# Patient Record
Sex: MALE | Race: BLACK OR AFRICAN AMERICAN | NOT HISPANIC OR LATINO | Employment: FULL TIME | URBAN - METROPOLITAN AREA
[De-identification: names, ages, dates, MRNs, and addresses within clinical notes are randomized per-mention and may not be internally consistent; named-entity substitution may affect disease eponyms.]

---

## 2017-01-30 ENCOUNTER — OCC HEALTH (OUTPATIENT)
Dept: OCCUPATIONAL MEDICINE | Age: 32
End: 2017-01-30

## 2017-01-30 DIAGNOSIS — Z02.89 ENCOUNTER FOR OCCUPATIONAL HEALTH EXAMINATION: ICD-10-CM

## 2017-01-30 PROCEDURE — OH063 AUDIOMETRIC TESTING INTERP: Performed by: PREVENTIVE MEDICINE

## 2017-01-30 PROCEDURE — 92552 PURE TONE AUDIOMETRY AIR: CPT | Performed by: PREVENTIVE MEDICINE

## 2017-02-01 ENCOUNTER — OCC HEALTH (OUTPATIENT)
Dept: OCCUPATIONAL MEDICINE | Age: 32
End: 2017-02-01

## 2017-02-01 DIAGNOSIS — Z02.89 ENCOUNTER FOR OCCUPATIONAL HEALTH EXAMINATION: Primary | ICD-10-CM

## 2017-02-01 PROCEDURE — OH071 RESPIRATORY (PFT) QUESTIONNAIRE: Performed by: PREVENTIVE MEDICINE

## 2017-08-28 ENCOUNTER — CASE MANAGEMENT (OUTPATIENT)
Dept: CARE COORDINATION | Age: 32
End: 2017-08-28

## 2018-01-09 ENCOUNTER — OCC HEALTH (OUTPATIENT)
Dept: OCCUPATIONAL MEDICINE | Age: 33
End: 2018-01-09

## 2018-01-09 DIAGNOSIS — Z02.89 VISIT FOR OCCUPATIONAL HEALTH EXAMINATION: Primary | ICD-10-CM

## 2018-01-09 PROCEDURE — OH071 RESPIRATORY (PFT) QUESTIONNAIRE: Performed by: PREVENTIVE MEDICINE

## 2018-01-15 ENCOUNTER — OCC HEALTH (OUTPATIENT)
Dept: OCCUPATIONAL MEDICINE | Age: 33
End: 2018-01-15

## 2018-01-15 DIAGNOSIS — Z02.89 VISIT FOR OCCUPATIONAL HEALTH EXAMINATION: ICD-10-CM

## 2018-01-15 DIAGNOSIS — Z02.89 ENCOUNTER FOR OCCUPATIONAL HEALTH EXAMINATION: Primary | ICD-10-CM

## 2018-01-15 PROCEDURE — 92552 PURE TONE AUDIOMETRY AIR: CPT | Performed by: PREVENTIVE MEDICINE

## 2018-01-15 PROCEDURE — OH063 AUDIOMETRIC TESTING INTERP: Performed by: PREVENTIVE MEDICINE

## 2018-04-04 ENCOUNTER — CASE MANAGEMENT (OUTPATIENT)
Dept: CARE COORDINATION | Age: 33
End: 2018-04-04

## 2018-04-19 ENCOUNTER — CASE MANAGEMENT (OUTPATIENT)
Dept: CARE COORDINATION | Age: 33
End: 2018-04-19

## 2018-04-27 ENCOUNTER — CASE MANAGEMENT (OUTPATIENT)
Dept: CARE COORDINATION | Age: 33
End: 2018-04-27

## 2018-05-11 ENCOUNTER — CASE MANAGEMENT (OUTPATIENT)
Dept: CARE COORDINATION | Age: 33
End: 2018-05-11

## 2018-05-14 ENCOUNTER — CASE MANAGEMENT (OUTPATIENT)
Dept: CARE COORDINATION | Age: 33
End: 2018-05-14

## 2018-05-23 ENCOUNTER — CASE MANAGEMENT (OUTPATIENT)
Dept: CARE COORDINATION | Age: 33
End: 2018-05-23

## 2018-06-07 ENCOUNTER — CASE MANAGEMENT (OUTPATIENT)
Dept: CARE COORDINATION | Age: 33
End: 2018-06-07

## 2018-08-09 ENCOUNTER — CASE MANAGEMENT (OUTPATIENT)
Dept: CARE COORDINATION | Age: 33
End: 2018-08-09

## 2018-08-09 VITALS — WEIGHT: 172.6 LBS | BODY MASS INDEX: 25.49 KG/M2

## 2019-01-11 ENCOUNTER — OCC HEALTH (OUTPATIENT)
Dept: OCCUPATIONAL MEDICINE | Age: 34
End: 2019-01-11

## 2019-01-11 DIAGNOSIS — Z02.89 VISIT FOR OCCUPATIONAL HEALTH EXAMINATION: ICD-10-CM

## 2019-01-11 DIAGNOSIS — Z02.89 VISIT FOR OCCUPATIONAL HEALTH EXAMINATION: Primary | ICD-10-CM

## 2019-01-11 DIAGNOSIS — Z02.89 ENCOUNTER FOR OCCUPATIONAL HEALTH EXAMINATION: Primary | ICD-10-CM

## 2019-01-11 PROCEDURE — 92552 PURE TONE AUDIOMETRY AIR: CPT | Performed by: PHYSICIAN ASSISTANT

## 2019-01-11 PROCEDURE — OH063 AUDIOMETRIC TESTING INTERP: Performed by: PHYSICIAN ASSISTANT

## 2019-01-11 PROCEDURE — OH071 RESPIRATORY (PFT) QUESTIONNAIRE: Performed by: PREVENTIVE MEDICINE

## 2019-01-23 ENCOUNTER — CASE MANAGEMENT (OUTPATIENT)
Dept: CARE COORDINATION | Age: 34
End: 2019-01-23

## 2019-10-04 ENCOUNTER — TRANSCRIBE ORDERS (OUTPATIENT)
Dept: ADMINISTRATIVE | Facility: HOSPITAL | Age: 34
End: 2019-10-04

## 2019-10-04 DIAGNOSIS — K21.9 GERD WITHOUT ESOPHAGITIS: Primary | ICD-10-CM

## 2019-10-11 ENCOUNTER — HOSPITAL ENCOUNTER (OUTPATIENT)
Dept: NUCLEAR MEDICINE | Facility: HOSPITAL | Age: 34
Discharge: HOME | End: 2019-10-11

## 2019-10-11 ENCOUNTER — HOSPITAL ENCOUNTER (OUTPATIENT)
Dept: ULTRASOUND IMAGING | Facility: HOSPITAL | Age: 34
Discharge: HOME | End: 2019-10-11
Admitting: FAMILY MEDICINE

## 2019-10-11 DIAGNOSIS — K21.9 GERD WITHOUT ESOPHAGITIS: ICD-10-CM

## 2019-10-11 PROCEDURE — 78226 HEPATOBILIARY SYSTEM IMAGING: CPT

## 2019-10-11 PROCEDURE — 0 TECHNETIUM TC 99M MEBROFENIN KIT: Performed by: FAMILY MEDICINE

## 2019-10-11 PROCEDURE — A9537 TC99M MEBROFENIN: HCPCS | Performed by: FAMILY MEDICINE

## 2019-10-11 PROCEDURE — 76705 ECHO EXAM OF ABDOMEN: CPT

## 2019-10-11 RX ORDER — KIT FOR THE PREPARATION OF TECHNETIUM TC 99M MEBROFENIN 45 MG/10ML
1 INJECTION, POWDER, LYOPHILIZED, FOR SOLUTION INTRAVENOUS
Status: COMPLETED | OUTPATIENT
Start: 2019-10-11 | End: 2019-10-11

## 2019-10-11 RX ADMIN — MEBROFENIN 1 DOSE: 45 INJECTION, POWDER, LYOPHILIZED, FOR SOLUTION INTRAVENOUS at 11:02

## 2019-11-06 RX ORDER — LORAZEPAM 1 MG/1
1 TABLET ORAL EVERY 8 HOURS PRN
COMMUNITY

## 2019-11-06 RX ORDER — SUCRALFATE ORAL 1 G/10ML
1 SUSPENSION ORAL 4 TIMES DAILY
COMMUNITY
End: 2019-11-14

## 2019-11-06 RX ORDER — OMEPRAZOLE 40 MG/1
40 CAPSULE, DELAYED RELEASE ORAL DAILY
COMMUNITY

## 2019-11-07 ENCOUNTER — CASE MANAGEMENT (OUTPATIENT)
Dept: CARE COORDINATION | Age: 34
End: 2019-11-07

## 2019-11-14 ENCOUNTER — APPOINTMENT (OUTPATIENT)
Dept: LAB | Facility: HOSPITAL | Age: 34
End: 2019-11-14

## 2019-11-14 ENCOUNTER — OFFICE VISIT (OUTPATIENT)
Dept: GASTROENTEROLOGY | Facility: CLINIC | Age: 34
End: 2019-11-14

## 2019-11-14 VITALS
WEIGHT: 315 LBS | HEART RATE: 99 BPM | DIASTOLIC BLOOD PRESSURE: 98 MMHG | HEIGHT: 78 IN | TEMPERATURE: 96.4 F | OXYGEN SATURATION: 99 % | SYSTOLIC BLOOD PRESSURE: 136 MMHG | BODY MASS INDEX: 36.45 KG/M2

## 2019-11-14 DIAGNOSIS — R79.89 ABNORMAL LFTS: ICD-10-CM

## 2019-11-14 DIAGNOSIS — R10.13 EPIGASTRIC PAIN: Primary | ICD-10-CM

## 2019-11-14 DIAGNOSIS — R11.2 NAUSEA AND VOMITING, INTRACTABILITY OF VOMITING NOT SPECIFIED, UNSPECIFIED VOMITING TYPE: ICD-10-CM

## 2019-11-14 DIAGNOSIS — K76.0 FATTY LIVER: ICD-10-CM

## 2019-11-14 PROCEDURE — 99214 OFFICE O/P EST MOD 30 MIN: CPT | Performed by: NURSE PRACTITIONER

## 2019-11-14 PROCEDURE — 80074 ACUTE HEPATITIS PANEL: CPT | Performed by: NURSE PRACTITIONER

## 2019-11-14 PROCEDURE — 84466 ASSAY OF TRANSFERRIN: CPT | Performed by: NURSE PRACTITIONER

## 2019-11-14 PROCEDURE — 82103 ALPHA-1-ANTITRYPSIN TOTAL: CPT | Performed by: NURSE PRACTITIONER

## 2019-11-14 PROCEDURE — 82728 ASSAY OF FERRITIN: CPT | Performed by: NURSE PRACTITIONER

## 2019-11-14 PROCEDURE — 36415 COLL VENOUS BLD VENIPUNCTURE: CPT | Performed by: NURSE PRACTITIONER

## 2019-11-14 PROCEDURE — 82390 ASSAY OF CERULOPLASMIN: CPT | Performed by: NURSE PRACTITIONER

## 2019-11-14 PROCEDURE — 80076 HEPATIC FUNCTION PANEL: CPT | Performed by: NURSE PRACTITIONER

## 2019-11-14 PROCEDURE — 83516 IMMUNOASSAY NONANTIBODY: CPT | Performed by: NURSE PRACTITIONER

## 2019-11-14 PROCEDURE — 83540 ASSAY OF IRON: CPT | Performed by: NURSE PRACTITIONER

## 2019-11-14 PROCEDURE — 86038 ANTINUCLEAR ANTIBODIES: CPT | Performed by: NURSE PRACTITIONER

## 2019-11-15 LAB
ACTIN IGG SERPL-ACNC: 5 UNITS (ref 0–19)
ALBUMIN SERPL-MCNC: 5.1 G/DL (ref 3.5–5.2)
ALP SERPL-CCNC: 69 U/L (ref 39–117)
ALPHA1 GLOB MFR UR ELPH: 132 MG/DL (ref 90–200)
ALT SERPL W P-5'-P-CCNC: 293 U/L (ref 1–41)
ANA SER QL IA: NEGATIVE
AST SERPL-CCNC: 252 U/L (ref 1–40)
BILIRUB CONJ SERPL-MCNC: 0.3 MG/DL (ref 0.2–0.3)
BILIRUB INDIRECT SERPL-MCNC: 0.8 MG/DL
BILIRUB SERPL-MCNC: 1.1 MG/DL (ref 0.2–1.2)
CERULOPLASMIN SERPL-MCNC: 24 MG/DL (ref 16–31)
DEPRECATED MITOCHONDRIA M2 IGG SER-ACNC: 63 UNITS (ref 0–20)
FERRITIN SERPL-MCNC: 1341 NG/ML (ref 30–400)
HAV IGM SERPL QL IA: NORMAL
HBV CORE IGM SERPL QL IA: NORMAL
HBV SURFACE AG SERPL QL IA: NORMAL
HCV AB SER DONR QL: NORMAL
IRON 24H UR-MRATE: 207 MCG/DL (ref 59–158)
IRON SATN MFR SERPL: 42 % (ref 20–50)
PROT SERPL-MCNC: 8.2 G/DL (ref 6–8.5)
TIBC SERPL-MCNC: 495 MCG/DL (ref 298–536)
TRANSFERRIN SERPL-MCNC: 332 MG/DL (ref 200–360)

## 2019-11-18 ENCOUNTER — TELEPHONE (OUTPATIENT)
Dept: GASTROENTEROLOGY | Facility: CLINIC | Age: 34
End: 2019-11-18

## 2019-11-18 DIAGNOSIS — R79.89 ABNORMAL LFTS: Primary | ICD-10-CM

## 2019-11-18 DIAGNOSIS — R89.9 ABNORMAL LABORATORY TEST: Primary | ICD-10-CM

## 2019-11-27 ENCOUNTER — HOSPITAL ENCOUNTER (OUTPATIENT)
Dept: ULTRASOUND IMAGING | Facility: HOSPITAL | Age: 34
Discharge: HOME | End: 2019-11-27

## 2019-11-27 ENCOUNTER — HOSPITAL ENCOUNTER (OUTPATIENT)
Dept: ULTRASOUND IMAGING | Facility: HOSPITAL | Age: 34
Discharge: HOME | End: 2019-11-27
Admitting: NURSE PRACTITIONER

## 2019-11-27 DIAGNOSIS — K76.0 FATTY LIVER: ICD-10-CM

## 2019-11-27 DIAGNOSIS — R79.89 ABNORMAL LFTS: ICD-10-CM

## 2019-11-27 PROCEDURE — 76705 ECHO EXAM OF ABDOMEN: CPT

## 2019-11-27 PROCEDURE — 76981 USE PARENCHYMA: CPT

## 2019-12-02 ENCOUNTER — TELEPHONE (OUTPATIENT)
Dept: GASTROENTEROLOGY | Facility: CLINIC | Age: 34
End: 2019-12-02

## 2019-12-04 ENCOUNTER — ANESTHESIA (OUTPATIENT)
Dept: GASTROENTEROLOGY | Facility: HOSPITAL | Age: 34
End: 2019-12-04

## 2019-12-04 ENCOUNTER — HOSPITAL ENCOUNTER (OUTPATIENT)
Facility: HOSPITAL | Age: 34
Setting detail: HOSPITAL OUTPATIENT SURGERY
Discharge: HOME | End: 2019-12-04
Attending: INTERNAL MEDICINE | Admitting: INTERNAL MEDICINE

## 2019-12-04 ENCOUNTER — ANESTHESIA EVENT (OUTPATIENT)
Dept: GASTROENTEROLOGY | Facility: HOSPITAL | Age: 34
End: 2019-12-04

## 2019-12-04 ENCOUNTER — TELEPHONE (OUTPATIENT)
Dept: GASTROENTEROLOGY | Facility: CLINIC | Age: 34
End: 2019-12-04

## 2019-12-04 VITALS
DIASTOLIC BLOOD PRESSURE: 64 MMHG | HEIGHT: 78 IN | HEART RATE: 90 BPM | WEIGHT: 315 LBS | TEMPERATURE: 96.9 F | SYSTOLIC BLOOD PRESSURE: 133 MMHG | OXYGEN SATURATION: 97 % | BODY MASS INDEX: 36.45 KG/M2 | RESPIRATION RATE: 17 BRPM

## 2019-12-04 DIAGNOSIS — R79.89 ABNORMAL LFTS: Primary | ICD-10-CM

## 2019-12-04 DIAGNOSIS — R10.13 EPIGASTRIC PAIN: ICD-10-CM

## 2019-12-04 DIAGNOSIS — R89.9 ABNORMAL LABORATORY TEST: ICD-10-CM

## 2019-12-04 DIAGNOSIS — R11.2 NAUSEA AND VOMITING, INTRACTABILITY OF VOMITING NOT SPECIFIED, UNSPECIFIED VOMITING TYPE: ICD-10-CM

## 2019-12-04 LAB
ALBUMIN SERPL-MCNC: 4.9 G/DL (ref 3.5–5.2)
ALP SERPL-CCNC: 63 U/L (ref 39–117)
ALT SERPL W P-5'-P-CCNC: 303 U/L (ref 1–41)
AST SERPL-CCNC: 230 U/L (ref 1–40)
BILIRUB CONJ SERPL-MCNC: 0.3 MG/DL (ref 0.2–0.3)
BILIRUB INDIRECT SERPL-MCNC: 0.7 MG/DL
BILIRUB SERPL-MCNC: 1 MG/DL (ref 0.2–1.2)
CHOLEST SERPL-MCNC: 274 MG/DL (ref 0–200)
HDLC SERPL-MCNC: 54 MG/DL (ref 40–60)
LDLC SERPL CALC-MCNC: 182 MG/DL (ref 0–100)
LDLC/HDLC SERPL: 3.37 {RATIO}
PROT SERPL-MCNC: 8 G/DL (ref 6–8.5)
TRIGL SERPL-MCNC: 191 MG/DL (ref 0–150)
VLDLC SERPL-MCNC: 38.2 MG/DL

## 2019-12-04 PROCEDURE — 25010000002 PROPOFOL 10 MG/ML EMULSION: Performed by: NURSE ANESTHETIST, CERTIFIED REGISTERED

## 2019-12-04 PROCEDURE — 84436 ASSAY OF TOTAL THYROXINE: CPT | Performed by: INTERNAL MEDICINE

## 2019-12-04 PROCEDURE — 82784 ASSAY IGA/IGD/IGG/IGM EACH: CPT | Performed by: NURSE PRACTITIONER

## 2019-12-04 PROCEDURE — 36415 COLL VENOUS BLD VENIPUNCTURE: CPT | Performed by: NURSE PRACTITIONER

## 2019-12-04 PROCEDURE — 80076 HEPATIC FUNCTION PANEL: CPT | Performed by: NURSE PRACTITIONER

## 2019-12-04 PROCEDURE — 80061 LIPID PANEL: CPT | Performed by: NURSE PRACTITIONER

## 2019-12-04 PROCEDURE — 86376 MICROSOMAL ANTIBODY EACH: CPT | Performed by: NURSE PRACTITIONER

## 2019-12-04 PROCEDURE — 88305 TISSUE EXAM BY PATHOLOGIST: CPT | Performed by: INTERNAL MEDICINE

## 2019-12-04 PROCEDURE — 87081 CULTURE SCREEN ONLY: CPT | Performed by: INTERNAL MEDICINE

## 2019-12-04 PROCEDURE — 43239 EGD BIOPSY SINGLE/MULTIPLE: CPT | Performed by: INTERNAL MEDICINE

## 2019-12-04 PROCEDURE — 81256 HFE GENE: CPT | Performed by: NURSE PRACTITIONER

## 2019-12-04 PROCEDURE — 84443 ASSAY THYROID STIM HORMONE: CPT | Performed by: INTERNAL MEDICINE

## 2019-12-04 PROCEDURE — 84479 ASSAY OF THYROID (T3 OR T4): CPT | Performed by: INTERNAL MEDICINE

## 2019-12-04 RX ORDER — PROPOFOL 10 MG/ML
VIAL (ML) INTRAVENOUS AS NEEDED
Status: DISCONTINUED | OUTPATIENT
Start: 2019-12-04 | End: 2019-12-04 | Stop reason: SURG

## 2019-12-04 RX ORDER — SODIUM CHLORIDE 9 MG/ML
500 INJECTION, SOLUTION INTRAVENOUS CONTINUOUS PRN
Status: DISCONTINUED | OUTPATIENT
Start: 2019-12-04 | End: 2019-12-04 | Stop reason: HOSPADM

## 2019-12-04 RX ORDER — SODIUM CHLORIDE 0.9 % (FLUSH) 0.9 %
10 SYRINGE (ML) INJECTION AS NEEDED
Status: DISCONTINUED | OUTPATIENT
Start: 2019-12-04 | End: 2019-12-04 | Stop reason: HOSPADM

## 2019-12-04 RX ORDER — ONDANSETRON 2 MG/ML
4 INJECTION INTRAMUSCULAR; INTRAVENOUS ONCE AS NEEDED
Status: DISCONTINUED | OUTPATIENT
Start: 2019-12-04 | End: 2019-12-04 | Stop reason: HOSPADM

## 2019-12-04 RX ADMIN — SODIUM CHLORIDE 500 ML: 9 INJECTION, SOLUTION INTRAVENOUS at 10:43

## 2019-12-04 RX ADMIN — LIDOCAINE HYDROCHLORIDE 50 MG: 20 INJECTION, SOLUTION INTRAVENOUS at 12:37

## 2019-12-04 RX ADMIN — PROPOFOL 400 MG: 10 INJECTION, EMULSION INTRAVENOUS at 12:37

## 2019-12-05 LAB
ALP LIVER CFR SERPL: 1.9 UNITS (ref 0–20)
CYTO UR: NORMAL
IGA1 MFR SER: 154 MG/DL (ref 70–400)
IGG1 SER-MCNC: 927 MG/DL (ref 700–1600)
IGM SERPL-MCNC: 147 MG/DL (ref 40–230)
LAB AP CASE REPORT: NORMAL
PATH REPORT.FINAL DX SPEC: NORMAL
PATH REPORT.GROSS SPEC: NORMAL
T-UPTAKE NFR SERPL: 1.02 TBI (ref 0.8–1.3)
T4 SERPL-MCNC: 4.7 MCG/DL (ref 4.5–11.7)
TSH SERPL DL<=0.05 MIU/L-ACNC: 2.73 UIU/ML (ref 0.27–4.2)
UREASE TISS QL: NEGATIVE

## 2019-12-09 LAB — HFE GENE MUT ANL BLD/T: NORMAL

## 2019-12-12 ENCOUNTER — TELEPHONE (OUTPATIENT)
Dept: GASTROENTEROLOGY | Facility: CLINIC | Age: 34
End: 2019-12-12

## 2020-01-07 ENCOUNTER — APPOINTMENT (OUTPATIENT)
Dept: LAB | Facility: HOSPITAL | Age: 35
End: 2020-01-07

## 2020-01-07 ENCOUNTER — TRANSCRIBE ORDERS (OUTPATIENT)
Dept: ADMINISTRATIVE | Facility: HOSPITAL | Age: 35
End: 2020-01-07

## 2020-01-07 ENCOUNTER — OFFICE VISIT (OUTPATIENT)
Dept: GASTROENTEROLOGY | Facility: CLINIC | Age: 35
End: 2020-01-07

## 2020-01-07 ENCOUNTER — LAB (OUTPATIENT)
Dept: LAB | Facility: HOSPITAL | Age: 35
End: 2020-01-07

## 2020-01-07 VITALS
BODY MASS INDEX: 36.45 KG/M2 | DIASTOLIC BLOOD PRESSURE: 100 MMHG | HEART RATE: 86 BPM | WEIGHT: 315 LBS | OXYGEN SATURATION: 97 % | HEIGHT: 78 IN | SYSTOLIC BLOOD PRESSURE: 172 MMHG | TEMPERATURE: 96.5 F

## 2020-01-07 DIAGNOSIS — K20.90 ESOPHAGITIS: ICD-10-CM

## 2020-01-07 DIAGNOSIS — R79.89 ABNORMAL LFTS: Primary | ICD-10-CM

## 2020-01-07 DIAGNOSIS — R10.13 EPIGASTRIC PAIN: ICD-10-CM

## 2020-01-07 DIAGNOSIS — D68.59 PRIMARY THROMBOPHILIA (HCC): Primary | ICD-10-CM

## 2020-01-07 DIAGNOSIS — R79.89 ABNORMAL LFTS: ICD-10-CM

## 2020-01-07 LAB
ALBUMIN SERPL-MCNC: 4.8 G/DL (ref 3.5–5.2)
ALP SERPL-CCNC: 78 U/L (ref 39–117)
ALT SERPL W P-5'-P-CCNC: 270 U/L (ref 1–41)
AST SERPL-CCNC: 213 U/L (ref 1–40)
BILIRUB CONJ SERPL-MCNC: 0.2 MG/DL (ref 0.2–0.3)
BILIRUB INDIRECT SERPL-MCNC: 0.7 MG/DL
BILIRUB SERPL-MCNC: 0.9 MG/DL (ref 0.2–1.2)
PROT SERPL-MCNC: 8.1 G/DL (ref 6–8.5)

## 2020-01-07 PROCEDURE — 85303 CLOT INHIBIT PROT C ACTIVITY: CPT | Performed by: PHYSICIAN ASSISTANT

## 2020-01-07 PROCEDURE — 36415 COLL VENOUS BLD VENIPUNCTURE: CPT | Performed by: PHYSICIAN ASSISTANT

## 2020-01-07 PROCEDURE — 80076 HEPATIC FUNCTION PANEL: CPT | Performed by: INTERNAL MEDICINE

## 2020-01-07 PROCEDURE — 85302 CLOT INHIBIT PROT C ANTIGEN: CPT | Performed by: PHYSICIAN ASSISTANT

## 2020-01-07 PROCEDURE — 99213 OFFICE O/P EST LOW 20 MIN: CPT | Performed by: INTERNAL MEDICINE

## 2020-01-07 PROCEDURE — 83516 IMMUNOASSAY NONANTIBODY: CPT | Performed by: INTERNAL MEDICINE

## 2020-01-09 LAB — DEPRECATED MITOCHONDRIA M2 IGG SER-ACNC: 68.2 UNITS (ref 0–20)

## 2020-01-10 LAB
PROT C AG PPP IA-ACNC: 52 % (ref 60–150)
PRT C ACTIVITY (CHROMOGENIC): 52 %

## 2020-01-16 ENCOUNTER — OFF PREMISE (OUTPATIENT)
Dept: OCCUPATIONAL MEDICINE | Age: 35
End: 2020-01-16

## 2020-01-16 DIAGNOSIS — Z02.89 VISIT FOR OCCUPATIONAL HEALTH EXAMINATION: Primary | ICD-10-CM

## 2020-01-16 PROCEDURE — OH071 RESPIRATORY (PFT) QUESTIONNAIRE: Performed by: NURSE PRACTITIONER

## 2020-01-30 ENCOUNTER — OFFICE VISIT (OUTPATIENT)
Dept: FAMILY MEDICINE | Age: 35
End: 2020-01-30

## 2020-01-30 VITALS
BODY MASS INDEX: 24.26 KG/M2 | SYSTOLIC BLOOD PRESSURE: 115 MMHG | HEIGHT: 69 IN | OXYGEN SATURATION: 99 % | TEMPERATURE: 98.8 F | HEART RATE: 72 BPM | DIASTOLIC BLOOD PRESSURE: 76 MMHG | RESPIRATION RATE: 20 BRPM | WEIGHT: 163.8 LBS

## 2020-01-30 DIAGNOSIS — Z76.89 ENCOUNTER TO ESTABLISH CARE: Primary | ICD-10-CM

## 2020-01-30 PROCEDURE — 99214 OFFICE O/P EST MOD 30 MIN: CPT | Performed by: FAMILY MEDICINE

## 2020-01-30 ASSESSMENT — PATIENT HEALTH QUESTIONNAIRE - PHQ9
SUM OF ALL RESPONSES TO PHQ9 QUESTIONS 1 AND 2: 0
SUM OF ALL RESPONSES TO PHQ9 QUESTIONS 1 AND 2: 0
1. LITTLE INTEREST OR PLEASURE IN DOING THINGS: NOT AT ALL
2. FEELING DOWN, DEPRESSED OR HOPELESS: NOT AT ALL

## 2020-02-24 ENCOUNTER — OFF PREMISE (OUTPATIENT)
Dept: OCCUPATIONAL MEDICINE | Age: 35
End: 2020-02-24

## 2020-02-24 DIAGNOSIS — Z02.89 ENCOUNTER FOR OCCUPATIONAL HEALTH EXAMINATION: Primary | ICD-10-CM

## 2020-02-24 DIAGNOSIS — Z02.89 VISIT FOR OCCUPATIONAL HEALTH EXAMINATION: ICD-10-CM

## 2020-02-24 PROCEDURE — 92552 PURE TONE AUDIOMETRY AIR: CPT | Performed by: PHYSICIAN ASSISTANT

## 2020-02-24 PROCEDURE — OH063 AUDIOMETRIC TESTING INTERP: Performed by: PREVENTIVE MEDICINE

## 2020-07-07 ENCOUNTER — LAB (OUTPATIENT)
Dept: LAB | Facility: HOSPITAL | Age: 35
End: 2020-07-07

## 2020-07-07 ENCOUNTER — OFFICE VISIT (OUTPATIENT)
Dept: GASTROENTEROLOGY | Facility: CLINIC | Age: 35
End: 2020-07-07

## 2020-07-07 VITALS
TEMPERATURE: 96.4 F | DIASTOLIC BLOOD PRESSURE: 112 MMHG | BODY MASS INDEX: 33.67 KG/M2 | WEIGHT: 291 LBS | OXYGEN SATURATION: 99 % | HEART RATE: 85 BPM | HEIGHT: 78 IN | SYSTOLIC BLOOD PRESSURE: 158 MMHG

## 2020-07-07 DIAGNOSIS — R79.89 ABNORMAL LFTS: ICD-10-CM

## 2020-07-07 DIAGNOSIS — R10.13 EPIGASTRIC PAIN: ICD-10-CM

## 2020-07-07 DIAGNOSIS — K20.90 ESOPHAGITIS: Primary | ICD-10-CM

## 2020-07-07 PROCEDURE — 83516 IMMUNOASSAY NONANTIBODY: CPT | Performed by: INTERNAL MEDICINE

## 2020-07-07 PROCEDURE — 80076 HEPATIC FUNCTION PANEL: CPT | Performed by: INTERNAL MEDICINE

## 2020-07-07 PROCEDURE — 99213 OFFICE O/P EST LOW 20 MIN: CPT | Performed by: INTERNAL MEDICINE

## 2020-07-07 PROCEDURE — 36415 COLL VENOUS BLD VENIPUNCTURE: CPT | Performed by: INTERNAL MEDICINE

## 2020-07-07 RX ORDER — OLMESARTAN MEDOXOMIL 20 MG/1
TABLET ORAL
COMMUNITY
Start: 2020-06-07

## 2020-07-08 LAB
ALBUMIN SERPL-MCNC: 4.4 G/DL (ref 3.5–5.2)
ALP SERPL-CCNC: 55 U/L (ref 39–117)
ALT SERPL W P-5'-P-CCNC: 74 U/L (ref 1–41)
AST SERPL-CCNC: 52 U/L (ref 1–40)
BILIRUB CONJ SERPL-MCNC: 0.2 MG/DL (ref 0–0.3)
BILIRUB INDIRECT SERPL-MCNC: 0.6 MG/DL
BILIRUB SERPL-MCNC: 0.8 MG/DL (ref 0–1.2)
DEPRECATED MITOCHONDRIA M2 IGG SER-ACNC: 62.6 UNITS (ref 0–20)
PROT SERPL-MCNC: 7.3 G/DL (ref 6–8.5)

## 2020-10-28 DIAGNOSIS — R79.89 ABNORMAL LFTS: Primary | ICD-10-CM

## 2020-11-27 ENCOUNTER — WALK IN (OUTPATIENT)
Dept: URGENT CARE | Age: 35
End: 2020-11-27

## 2020-11-27 DIAGNOSIS — Z20.822 SUSPECTED COVID-19 VIRUS INFECTION: Primary | ICD-10-CM

## 2020-11-27 DIAGNOSIS — J06.9 VIRAL URI WITH COUGH: ICD-10-CM

## 2020-11-27 PROCEDURE — 99213 OFFICE O/P EST LOW 20 MIN: CPT | Performed by: NURSE PRACTITIONER

## 2020-11-27 PROCEDURE — U0003 INFECTIOUS AGENT DETECTION BY NUCLEIC ACID (DNA OR RNA); SEVERE ACUTE RESPIRATORY SYNDROME CORONAVIRUS 2 (SARS-COV-2) (CORONAVIRUS DISEASE [COVID-19]), AMPLIFIED PROBE TECHNIQUE, MAKING USE OF HIGH THROUGHPUT TECHNOLOGIES AS DESCRIBED BY CMS-2020-01-R: HCPCS | Performed by: INTERNAL MEDICINE

## 2020-11-27 RX ORDER — FLUTICASONE PROPIONATE 50 MCG
1 SPRAY, SUSPENSION (ML) NASAL 2 TIMES DAILY
Qty: 16 G | Refills: 0 | Status: SHIPPED | OUTPATIENT
Start: 2020-11-27 | End: 2022-03-10 | Stop reason: ALTCHOICE

## 2020-11-27 ASSESSMENT — ENCOUNTER SYMPTOMS
TROUBLE SWALLOWING: 0
NAUSEA: 0
DIARRHEA: 0
SINUS PRESSURE: 1
FEVER: 0
COUGH: 1
STRIDOR: 0
DIZZINESS: 0
SINUS PAIN: 0
SHORTNESS OF BREATH: 0
WHEEZING: 0
VOMITING: 0
FACIAL SWELLING: 0
ABDOMINAL PAIN: 0
CHEST TIGHTNESS: 0
CHILLS: 1
SORE THROAT: 1
HEADACHES: 0
APPETITE CHANGE: 0
LIGHT-HEADEDNESS: 0
VOICE CHANGE: 0
RHINORRHEA: 0

## 2020-11-27 ASSESSMENT — PAIN SCALES - GENERAL: PAINLEVEL: 1-2

## 2020-11-30 ENCOUNTER — TELEPHONE (OUTPATIENT)
Dept: URGENT CARE | Age: 35
End: 2020-11-30

## 2020-11-30 LAB — SARS-COV-2 RNA RESP QL NAA+PROBE: NOT DETECTED

## 2021-02-08 ENCOUNTER — LAB (OUTPATIENT)
Dept: LAB | Facility: HOSPITAL | Age: 36
End: 2021-02-08

## 2021-02-08 ENCOUNTER — TRANSCRIBE ORDERS (OUTPATIENT)
Dept: ADMINISTRATIVE | Facility: HOSPITAL | Age: 36
End: 2021-02-08

## 2021-02-08 DIAGNOSIS — N46.8 PRIMARY MALE INFERTILITY: Primary | ICD-10-CM

## 2021-02-08 LAB
CHARACTER SMN: NORMAL
COLLECTION METHOD SMN: NORMAL
EPI CELLS #/AREA SMN HPF: NORMAL /HPF
LIQUEFACTION TIME SMN: NORMAL MIN
NON PROGRESSIVE MOTILITY: 9.3 %
RBC #/AREA SMN HPF: NORMAL /HPF
SEX ABSTIN DURATION TIME PATIENT: 3 DAYS
SLOW PROGRESSIVE MOTILITY: 15.2 %
SMI: 281 (ref 80–400)
SPEC CONTAINER SPEC: NORMAL
SPECIMEN VOL SMN: 4.2 ML
SPERM # SMN: 80.8 MILLIONS/ML
SPERM IMMOTILE NFR SMN: 39 %
SPERM MOTILE NFR SMN: 61 % MOTILE (ref 50–100)
SPERM PROG NFR SMN: 36.5 % (ref 25–100)
SPERM SMN: 22.1 % NORMAL (ref 15–100)
VISC SMN: NORMAL CP
WBC SMN QL: NORMAL /HPF
WHO STANDARD: NORMAL

## 2021-02-08 PROCEDURE — 89320 SEMEN ANAL VOL/COUNT/MOT: CPT | Performed by: OBSTETRICS & GYNECOLOGY

## 2021-04-07 VITALS
OXYGEN SATURATION: 99 % | HEIGHT: 69 IN | BODY MASS INDEX: 25.92 KG/M2 | HEART RATE: 64 BPM | WEIGHT: 175 LBS | TEMPERATURE: 98.2 F | SYSTOLIC BLOOD PRESSURE: 100 MMHG | RESPIRATION RATE: 16 BRPM | DIASTOLIC BLOOD PRESSURE: 60 MMHG

## 2021-06-03 ENCOUNTER — WALK IN (OUTPATIENT)
Dept: URGENT CARE | Age: 36
End: 2021-06-03

## 2021-06-03 VITALS
DIASTOLIC BLOOD PRESSURE: 62 MMHG | HEART RATE: 60 BPM | RESPIRATION RATE: 18 BRPM | SYSTOLIC BLOOD PRESSURE: 116 MMHG | TEMPERATURE: 97.8 F | OXYGEN SATURATION: 98 %

## 2021-06-03 DIAGNOSIS — M54.41 ACUTE RIGHT-SIDED LOW BACK PAIN WITH RIGHT-SIDED SCIATICA: Primary | ICD-10-CM

## 2021-06-03 PROCEDURE — 96372 THER/PROPH/DIAG INJ SC/IM: CPT | Performed by: FAMILY MEDICINE

## 2021-06-03 PROCEDURE — 99213 OFFICE O/P EST LOW 20 MIN: CPT | Performed by: FAMILY MEDICINE

## 2021-06-03 RX ORDER — KETOROLAC TROMETHAMINE 30 MG/ML
60 INJECTION, SOLUTION INTRAMUSCULAR; INTRAVENOUS ONCE
Status: COMPLETED | OUTPATIENT
Start: 2021-06-03 | End: 2021-06-03

## 2021-06-03 RX ORDER — TIZANIDINE 4 MG/1
4 TABLET ORAL EVERY 6 HOURS PRN
Qty: 12 TABLET | Refills: 0 | Status: SHIPPED | OUTPATIENT
Start: 2021-06-03 | End: 2022-03-10 | Stop reason: ALTCHOICE

## 2021-06-03 RX ADMIN — KETOROLAC TROMETHAMINE 60 MG: 30 INJECTION, SOLUTION INTRAMUSCULAR; INTRAVENOUS at 11:18

## 2021-06-03 ASSESSMENT — ENCOUNTER SYMPTOMS: BACK PAIN: 1

## 2021-11-02 ENCOUNTER — LAB SERVICES (OUTPATIENT)
Dept: LAB | Age: 36
End: 2021-11-02

## 2021-11-02 ENCOUNTER — OFFICE VISIT (OUTPATIENT)
Dept: FAMILY MEDICINE | Age: 36
End: 2021-11-02

## 2021-11-02 VITALS
RESPIRATION RATE: 18 BRPM | OXYGEN SATURATION: 100 % | WEIGHT: 169 LBS | TEMPERATURE: 98.1 F | HEIGHT: 68 IN | BODY MASS INDEX: 25.61 KG/M2 | HEART RATE: 65 BPM | DIASTOLIC BLOOD PRESSURE: 86 MMHG | SYSTOLIC BLOOD PRESSURE: 125 MMHG

## 2021-11-02 DIAGNOSIS — Z00.8 ENCOUNTER FOR BIOMETRIC SCREENING: ICD-10-CM

## 2021-11-02 DIAGNOSIS — Z23 NEED FOR VACCINATION: ICD-10-CM

## 2021-11-02 DIAGNOSIS — Z00.00 ANNUAL PHYSICAL EXAM: Primary | ICD-10-CM

## 2021-11-02 DIAGNOSIS — Z00.00 ANNUAL PHYSICAL EXAM: ICD-10-CM

## 2021-11-02 LAB
ALBUMIN SERPL-MCNC: 4.2 G/DL (ref 3.6–5.1)
ALBUMIN/GLOB SERPL: 1.2 {RATIO} (ref 1–2.4)
ALP SERPL-CCNC: 40 UNITS/L (ref 45–117)
ALT SERPL-CCNC: 29 UNITS/L
ANION GAP SERPL CALC-SCNC: 9 MMOL/L (ref 10–20)
AST SERPL-CCNC: 24 UNITS/L
BILIRUB SERPL-MCNC: 0.8 MG/DL (ref 0.2–1)
BUN SERPL-MCNC: 17 MG/DL (ref 6–20)
BUN/CREAT SERPL: 14 (ref 7–25)
CALCIUM SERPL-MCNC: 9.4 MG/DL (ref 8.4–10.2)
CHLORIDE SERPL-SCNC: 105 MMOL/L (ref 98–107)
CHOLEST SERPL-MCNC: 185 MG/DL
CHOLEST/HDLC SERPL: 3.6 {RATIO}
CO2 SERPL-SCNC: 28 MMOL/L (ref 21–32)
CREAT SERPL-MCNC: 1.2 MG/DL (ref 0.67–1.17)
FASTING DURATION TIME PATIENT: 12 HOURS (ref 0–999)
FASTING DURATION TIME PATIENT: 12 HOURS (ref 0–999)
GFR SERPLBLD BASED ON 1.73 SQ M-ARVRAT: 89 ML/MIN
GLOBULIN SER-MCNC: 3.5 G/DL (ref 2–4)
GLUCOSE SERPL-MCNC: 94 MG/DL (ref 70–99)
HDLC SERPL-MCNC: 51 MG/DL
LDLC SERPL CALC-MCNC: 122 MG/DL
NONHDLC SERPL-MCNC: 134 MG/DL
POTASSIUM SERPL-SCNC: 4 MMOL/L (ref 3.4–5.1)
PROT SERPL-MCNC: 7.7 G/DL (ref 6.4–8.2)
SODIUM SERPL-SCNC: 138 MMOL/L (ref 135–145)
TRIGL SERPL-MCNC: 58 MG/DL

## 2021-11-02 PROCEDURE — 99395 PREV VISIT EST AGE 18-39: CPT | Performed by: FAMILY MEDICINE

## 2021-11-02 PROCEDURE — 80061 LIPID PANEL: CPT | Performed by: INTERNAL MEDICINE

## 2021-11-02 PROCEDURE — 90471 IMMUNIZATION ADMIN: CPT | Performed by: FAMILY MEDICINE

## 2021-11-02 PROCEDURE — 36415 COLL VENOUS BLD VENIPUNCTURE: CPT | Performed by: INTERNAL MEDICINE

## 2021-11-02 PROCEDURE — 80053 COMPREHEN METABOLIC PANEL: CPT | Performed by: INTERNAL MEDICINE

## 2021-11-02 PROCEDURE — 90686 IIV4 VACC NO PRSV 0.5 ML IM: CPT | Performed by: FAMILY MEDICINE

## 2021-11-02 ASSESSMENT — PATIENT HEALTH QUESTIONNAIRE - PHQ9
2. FEELING DOWN, DEPRESSED OR HOPELESS: NOT AT ALL
CLINICAL INTERPRETATION OF PHQ2 SCORE: NO FURTHER SCREENING NEEDED
SUM OF ALL RESPONSES TO PHQ9 QUESTIONS 1 AND 2: 0
CLINICAL INTERPRETATION OF PHQ9 SCORE: NO FURTHER SCREENING NEEDED
SUM OF ALL RESPONSES TO PHQ9 QUESTIONS 1 AND 2: 0
1. LITTLE INTEREST OR PLEASURE IN DOING THINGS: NOT AT ALL
SUM OF ALL RESPONSES TO PHQ9 QUESTIONS 1 AND 2: 0

## 2021-11-02 ASSESSMENT — PAIN SCALES - GENERAL: PAINLEVEL: 0

## 2021-11-03 ENCOUNTER — TELEPHONE (OUTPATIENT)
Dept: FAMILY MEDICINE | Age: 36
End: 2021-11-03

## 2022-01-25 ENCOUNTER — OFFICE VISIT (OUTPATIENT)
Dept: FAMILY MEDICINE | Age: 37
End: 2022-01-25

## 2022-01-25 VITALS
HEIGHT: 68 IN | WEIGHT: 176.7 LBS | OXYGEN SATURATION: 99 % | SYSTOLIC BLOOD PRESSURE: 111 MMHG | HEART RATE: 77 BPM | RESPIRATION RATE: 16 BRPM | DIASTOLIC BLOOD PRESSURE: 71 MMHG | BODY MASS INDEX: 26.78 KG/M2

## 2022-01-25 DIAGNOSIS — R41.840 ATTENTION DISTURBANCE: Primary | ICD-10-CM

## 2022-01-25 PROCEDURE — 99213 OFFICE O/P EST LOW 20 MIN: CPT | Performed by: FAMILY MEDICINE

## 2022-01-27 ENCOUNTER — BEHAVIORAL HEALTH (OUTPATIENT)
Dept: BEHAVIORAL HEALTH | Age: 37
End: 2022-01-27
Attending: FAMILY MEDICINE

## 2022-01-27 DIAGNOSIS — F90.0 ATTENTION DEFICIT HYPERACTIVITY DISORDER (ADHD), PREDOMINANTLY INATTENTIVE TYPE: Primary | ICD-10-CM

## 2022-01-27 DIAGNOSIS — F43.10 PTSD (POST-TRAUMATIC STRESS DISORDER): ICD-10-CM

## 2022-01-27 DIAGNOSIS — F33.1 MODERATE EPISODE OF RECURRENT MAJOR DEPRESSIVE DISORDER (CMD): ICD-10-CM

## 2022-01-27 PROCEDURE — 90792 PSYCH DIAG EVAL W/MED SRVCS: CPT | Performed by: REGISTERED NURSE

## 2022-01-27 RX ORDER — BUPROPION HYDROCHLORIDE 150 MG/1
150 TABLET ORAL DAILY
Qty: 30 TABLET | Refills: 2 | Status: SHIPPED | OUTPATIENT
Start: 2022-01-27 | End: 2022-03-10

## 2022-02-04 ENCOUNTER — E-ADVICE (OUTPATIENT)
Dept: BEHAVIORAL HEALTH | Age: 37
End: 2022-02-04

## 2022-03-10 ENCOUNTER — BEHAVIORAL HEALTH (OUTPATIENT)
Dept: BEHAVIORAL HEALTH | Age: 37
End: 2022-03-10

## 2022-03-10 DIAGNOSIS — F43.10 PTSD (POST-TRAUMATIC STRESS DISORDER): ICD-10-CM

## 2022-03-10 DIAGNOSIS — F90.0 ATTENTION DEFICIT HYPERACTIVITY DISORDER (ADHD), PREDOMINANTLY INATTENTIVE TYPE: Primary | ICD-10-CM

## 2022-03-10 DIAGNOSIS — F33.1 MODERATE EPISODE OF RECURRENT MAJOR DEPRESSIVE DISORDER (CMD): ICD-10-CM

## 2022-03-10 PROCEDURE — 99215 OFFICE O/P EST HI 40 MIN: CPT | Performed by: REGISTERED NURSE

## 2022-03-10 RX ORDER — LISDEXAMFETAMINE DIMESYLATE CAPSULES 20 MG/1
20 CAPSULE ORAL EVERY MORNING
Qty: 30 CAPSULE | Refills: 0 | Status: SHIPPED | OUTPATIENT
Start: 2022-03-10 | End: 2022-03-16 | Stop reason: DRUGHIGH

## 2022-03-16 ENCOUNTER — BEHAVIORAL HEALTH (OUTPATIENT)
Dept: BEHAVIORAL HEALTH | Age: 37
End: 2022-03-16

## 2022-03-16 ENCOUNTER — E-ADVICE (OUTPATIENT)
Dept: BEHAVIORAL HEALTH | Age: 37
End: 2022-03-16

## 2022-03-16 DIAGNOSIS — F33.1 MODERATE EPISODE OF RECURRENT MAJOR DEPRESSIVE DISORDER (CMD): ICD-10-CM

## 2022-03-16 DIAGNOSIS — F90.0 ATTENTION DEFICIT HYPERACTIVITY DISORDER (ADHD), PREDOMINANTLY INATTENTIVE TYPE: Primary | ICD-10-CM

## 2022-03-16 DIAGNOSIS — F43.10 PTSD (POST-TRAUMATIC STRESS DISORDER): ICD-10-CM

## 2022-03-16 PROCEDURE — 99214 OFFICE O/P EST MOD 30 MIN: CPT | Performed by: REGISTERED NURSE

## 2022-03-16 RX ORDER — LISDEXAMFETAMINE DIMESYLATE CAPSULES 40 MG/1
40 CAPSULE ORAL EVERY MORNING
Qty: 30 CAPSULE | Refills: 0 | Status: SHIPPED | OUTPATIENT
Start: 2022-03-16 | End: 2022-04-06 | Stop reason: ALTCHOICE

## 2022-03-25 ENCOUNTER — E-ADVICE (OUTPATIENT)
Dept: BEHAVIORAL HEALTH | Age: 37
End: 2022-03-25

## 2022-03-25 RX ORDER — BUPROPION HYDROCHLORIDE 150 MG/1
150 TABLET ORAL DAILY
Qty: 30 TABLET | Refills: 0 | Status: SHIPPED | OUTPATIENT
Start: 2022-03-25 | End: 2022-04-06 | Stop reason: SDUPTHER

## 2022-04-06 ENCOUNTER — BEHAVIORAL HEALTH (OUTPATIENT)
Dept: BEHAVIORAL HEALTH | Age: 37
End: 2022-04-06

## 2022-04-06 DIAGNOSIS — F33.1 MODERATE EPISODE OF RECURRENT MAJOR DEPRESSIVE DISORDER (CMD): ICD-10-CM

## 2022-04-06 DIAGNOSIS — F43.10 PTSD (POST-TRAUMATIC STRESS DISORDER): ICD-10-CM

## 2022-04-06 DIAGNOSIS — F90.0 ATTENTION DEFICIT HYPERACTIVITY DISORDER (ADHD), PREDOMINANTLY INATTENTIVE TYPE: Primary | ICD-10-CM

## 2022-04-06 PROCEDURE — 99215 OFFICE O/P EST HI 40 MIN: CPT | Performed by: REGISTERED NURSE

## 2022-04-06 RX ORDER — DEXMETHYLPHENIDATE HYDROCHLORIDE 10 MG/1
10 CAPSULE, EXTENDED RELEASE ORAL DAILY
Qty: 30 CAPSULE | Refills: 0 | Status: SHIPPED | OUTPATIENT
Start: 2022-04-06 | End: 2022-05-03 | Stop reason: SDUPTHER

## 2022-04-06 RX ORDER — BUPROPION HYDROCHLORIDE 150 MG/1
150 TABLET ORAL DAILY
Qty: 30 TABLET | Refills: 2 | Status: SHIPPED | OUTPATIENT
Start: 2022-04-06 | End: 2022-06-29 | Stop reason: SDUPTHER

## 2022-04-28 ENCOUNTER — OFF PREMISE (OUTPATIENT)
Dept: OCCUPATIONAL MEDICINE | Age: 37
End: 2022-04-28

## 2022-04-28 DIAGNOSIS — Z02.89 VISIT FOR OCCUPATIONAL HEALTH EXAMINATION: Primary | ICD-10-CM

## 2022-04-28 PROCEDURE — OH071 RESPIRATORY (PFT) QUESTIONNAIRE: Performed by: PREVENTIVE MEDICINE

## 2022-04-28 PROCEDURE — 94010 BREATHING CAPACITY TEST: CPT | Performed by: PREVENTIVE MEDICINE

## 2022-05-03 ENCOUNTER — BEHAVIORAL HEALTH (OUTPATIENT)
Dept: BEHAVIORAL HEALTH | Age: 37
End: 2022-05-03

## 2022-05-03 DIAGNOSIS — F90.0 ATTENTION DEFICIT HYPERACTIVITY DISORDER (ADHD), PREDOMINANTLY INATTENTIVE TYPE: ICD-10-CM

## 2022-05-03 DIAGNOSIS — F33.1 MODERATE EPISODE OF RECURRENT MAJOR DEPRESSIVE DISORDER (CMD): ICD-10-CM

## 2022-05-03 DIAGNOSIS — F43.10 PTSD (POST-TRAUMATIC STRESS DISORDER): Primary | ICD-10-CM

## 2022-05-03 PROCEDURE — 99215 OFFICE O/P EST HI 40 MIN: CPT | Performed by: REGISTERED NURSE

## 2022-05-03 RX ORDER — DEXMETHYLPHENIDATE HYDROCHLORIDE 10 MG/1
30 CAPSULE, EXTENDED RELEASE ORAL DAILY
Qty: 90 CAPSULE | Refills: 0 | Status: SHIPPED | OUTPATIENT
Start: 2022-05-03 | End: 2022-08-02 | Stop reason: ALTCHOICE

## 2022-05-04 RX ORDER — BUPROPION HYDROCHLORIDE 150 MG/1
150 TABLET ORAL DAILY
Qty: 30 TABLET | Refills: 2 | OUTPATIENT
Start: 2022-05-04

## 2022-05-09 ENCOUNTER — OFF PREMISE (OUTPATIENT)
Dept: OCCUPATIONAL MEDICINE | Age: 37
End: 2022-05-09

## 2022-05-09 DIAGNOSIS — Z02.89 ENCOUNTER FOR OCCUPATIONAL HEALTH EXAMINATION: Primary | ICD-10-CM

## 2022-05-09 DIAGNOSIS — Z02.89 VISIT FOR OCCUPATIONAL HEALTH EXAMINATION: ICD-10-CM

## 2022-05-09 PROCEDURE — 92552 PURE TONE AUDIOMETRY AIR: CPT | Performed by: PHYSICIAN ASSISTANT

## 2022-05-09 PROCEDURE — OH063 AUDIOMETRIC TESTING INTERP: Performed by: PHYSICIAN ASSISTANT

## 2022-05-19 ENCOUNTER — BEHAVIORAL HEALTH (OUTPATIENT)
Dept: BEHAVIORAL HEALTH | Age: 37
End: 2022-05-19

## 2022-05-19 DIAGNOSIS — F90.0 ATTENTION DEFICIT HYPERACTIVITY DISORDER (ADHD), PREDOMINANTLY INATTENTIVE TYPE: ICD-10-CM

## 2022-05-19 DIAGNOSIS — F43.10 PTSD (POST-TRAUMATIC STRESS DISORDER): Primary | ICD-10-CM

## 2022-05-19 DIAGNOSIS — F33.1 MODERATE EPISODE OF RECURRENT MAJOR DEPRESSIVE DISORDER (CMD): ICD-10-CM

## 2022-05-19 PROCEDURE — 90791 PSYCH DIAGNOSTIC EVALUATION: CPT | Performed by: PSYCHOLOGIST

## 2022-05-31 ENCOUNTER — BEHAVIORAL HEALTH (OUTPATIENT)
Dept: BEHAVIORAL HEALTH | Age: 37
End: 2022-05-31

## 2022-05-31 DIAGNOSIS — F90.0 ATTENTION DEFICIT HYPERACTIVITY DISORDER (ADHD), PREDOMINANTLY INATTENTIVE TYPE: ICD-10-CM

## 2022-05-31 DIAGNOSIS — F43.10 PTSD (POST-TRAUMATIC STRESS DISORDER): ICD-10-CM

## 2022-05-31 DIAGNOSIS — F33.1 MODERATE EPISODE OF RECURRENT MAJOR DEPRESSIVE DISORDER (CMD): Primary | ICD-10-CM

## 2022-05-31 PROCEDURE — 90837 PSYTX W PT 60 MINUTES: CPT | Performed by: PSYCHOLOGIST

## 2022-06-07 ENCOUNTER — BEHAVIORAL HEALTH (OUTPATIENT)
Dept: BEHAVIORAL HEALTH | Age: 37
End: 2022-06-07

## 2022-06-07 ENCOUNTER — E-ADVICE (OUTPATIENT)
Dept: BEHAVIORAL HEALTH | Age: 37
End: 2022-06-07

## 2022-06-07 DIAGNOSIS — F90.0 ATTENTION DEFICIT HYPERACTIVITY DISORDER (ADHD), PREDOMINANTLY INATTENTIVE TYPE: ICD-10-CM

## 2022-06-07 DIAGNOSIS — F33.1 MODERATE EPISODE OF RECURRENT MAJOR DEPRESSIVE DISORDER (CMD): Primary | ICD-10-CM

## 2022-06-07 DIAGNOSIS — F43.10 PTSD (POST-TRAUMATIC STRESS DISORDER): ICD-10-CM

## 2022-06-07 PROCEDURE — 99215 OFFICE O/P EST HI 40 MIN: CPT | Performed by: REGISTERED NURSE

## 2022-06-07 RX ORDER — SERTRALINE HYDROCHLORIDE 25 MG/1
TABLET, FILM COATED ORAL
Qty: 27 TABLET | Refills: 1 | Status: SHIPPED | OUTPATIENT
Start: 2022-06-07 | End: 2022-07-05 | Stop reason: DRUGHIGH

## 2022-06-14 ENCOUNTER — BEHAVIORAL HEALTH (OUTPATIENT)
Dept: BEHAVIORAL HEALTH | Age: 37
End: 2022-06-14

## 2022-06-14 DIAGNOSIS — F90.0 ATTENTION DEFICIT HYPERACTIVITY DISORDER (ADHD), PREDOMINANTLY INATTENTIVE TYPE: ICD-10-CM

## 2022-06-14 DIAGNOSIS — F33.1 MODERATE EPISODE OF RECURRENT MAJOR DEPRESSIVE DISORDER (CMD): Primary | ICD-10-CM

## 2022-06-14 DIAGNOSIS — F43.10 PTSD (POST-TRAUMATIC STRESS DISORDER): ICD-10-CM

## 2022-06-14 PROCEDURE — 90837 PSYTX W PT 60 MINUTES: CPT | Performed by: PSYCHOLOGIST

## 2022-06-28 ENCOUNTER — APPOINTMENT (OUTPATIENT)
Dept: BEHAVIORAL HEALTH | Age: 37
End: 2022-06-28

## 2022-06-28 ENCOUNTER — TELEPHONE (OUTPATIENT)
Dept: BEHAVIORAL HEALTH | Age: 37
End: 2022-06-28

## 2022-06-30 ENCOUNTER — BEHAVIORAL HEALTH (OUTPATIENT)
Dept: BEHAVIORAL HEALTH | Age: 37
End: 2022-06-30

## 2022-06-30 DIAGNOSIS — F90.0 ATTENTION DEFICIT HYPERACTIVITY DISORDER (ADHD), PREDOMINANTLY INATTENTIVE TYPE: ICD-10-CM

## 2022-06-30 DIAGNOSIS — F33.1 MODERATE EPISODE OF RECURRENT MAJOR DEPRESSIVE DISORDER (CMD): Primary | ICD-10-CM

## 2022-06-30 DIAGNOSIS — F43.10 PTSD (POST-TRAUMATIC STRESS DISORDER): ICD-10-CM

## 2022-06-30 PROCEDURE — 90834 PSYTX W PT 45 MINUTES: CPT | Performed by: PSYCHOLOGIST

## 2022-07-01 RX ORDER — BUPROPION HYDROCHLORIDE 150 MG/1
150 TABLET ORAL DAILY
Qty: 30 TABLET | Refills: 0 | Status: SHIPPED | OUTPATIENT
Start: 2022-07-01 | End: 2022-07-05 | Stop reason: ALTCHOICE

## 2022-07-05 ENCOUNTER — BEHAVIORAL HEALTH (OUTPATIENT)
Dept: BEHAVIORAL HEALTH | Age: 37
End: 2022-07-05

## 2022-07-05 DIAGNOSIS — F90.0 ATTENTION DEFICIT HYPERACTIVITY DISORDER (ADHD), PREDOMINANTLY INATTENTIVE TYPE: ICD-10-CM

## 2022-07-05 DIAGNOSIS — F43.10 PTSD (POST-TRAUMATIC STRESS DISORDER): ICD-10-CM

## 2022-07-05 DIAGNOSIS — F33.1 MODERATE EPISODE OF RECURRENT MAJOR DEPRESSIVE DISORDER (CMD): Primary | ICD-10-CM

## 2022-07-05 PROCEDURE — 99215 OFFICE O/P EST HI 40 MIN: CPT | Performed by: REGISTERED NURSE

## 2022-07-05 RX ORDER — METHYLPHENIDATE HYDROCHLORIDE 18 MG/1
18 TABLET ORAL EVERY MORNING
Qty: 30 TABLET | Refills: 0 | Status: SHIPPED | OUTPATIENT
Start: 2022-07-05 | End: 2022-08-30 | Stop reason: ALTCHOICE

## 2022-07-06 ENCOUNTER — APPOINTMENT (OUTPATIENT)
Dept: BEHAVIORAL HEALTH | Age: 37
End: 2022-07-06

## 2022-07-07 ENCOUNTER — BEHAVIORAL HEALTH (OUTPATIENT)
Dept: BEHAVIORAL HEALTH | Age: 37
End: 2022-07-07

## 2022-07-07 DIAGNOSIS — F33.1 MODERATE EPISODE OF RECURRENT MAJOR DEPRESSIVE DISORDER (CMD): Primary | ICD-10-CM

## 2022-07-07 DIAGNOSIS — F90.0 ATTENTION DEFICIT HYPERACTIVITY DISORDER (ADHD), PREDOMINANTLY INATTENTIVE TYPE: ICD-10-CM

## 2022-07-07 DIAGNOSIS — F43.10 PTSD (POST-TRAUMATIC STRESS DISORDER): ICD-10-CM

## 2022-07-07 PROCEDURE — 90837 PSYTX W PT 60 MINUTES: CPT | Performed by: PSYCHOLOGIST

## 2022-07-19 ENCOUNTER — E-ADVICE (OUTPATIENT)
Dept: BEHAVIORAL HEALTH | Age: 37
End: 2022-07-19

## 2022-07-20 ENCOUNTER — APPOINTMENT (OUTPATIENT)
Dept: PSYCHIATRY | Age: 37
End: 2022-07-20

## 2022-07-20 ENCOUNTER — BEHAVIORAL HEALTH (OUTPATIENT)
Dept: BEHAVIORAL HEALTH | Age: 37
End: 2022-07-20

## 2022-07-20 DIAGNOSIS — F43.10 PTSD (POST-TRAUMATIC STRESS DISORDER): ICD-10-CM

## 2022-07-20 DIAGNOSIS — F33.1 MODERATE EPISODE OF RECURRENT MAJOR DEPRESSIVE DISORDER (CMD): Primary | ICD-10-CM

## 2022-07-20 PROCEDURE — 90837 PSYTX W PT 60 MINUTES: CPT | Performed by: PSYCHOLOGIST

## 2022-08-02 ENCOUNTER — BEHAVIORAL HEALTH (OUTPATIENT)
Dept: BEHAVIORAL HEALTH | Age: 37
End: 2022-08-02

## 2022-08-02 DIAGNOSIS — F43.10 PTSD (POST-TRAUMATIC STRESS DISORDER): ICD-10-CM

## 2022-08-02 DIAGNOSIS — F33.1 MODERATE EPISODE OF RECURRENT MAJOR DEPRESSIVE DISORDER (CMD): Primary | ICD-10-CM

## 2022-08-02 DIAGNOSIS — F90.0 ATTENTION DEFICIT HYPERACTIVITY DISORDER (ADHD), PREDOMINANTLY INATTENTIVE TYPE: ICD-10-CM

## 2022-08-02 PROCEDURE — 99215 OFFICE O/P EST HI 40 MIN: CPT | Performed by: REGISTERED NURSE

## 2022-08-02 PROCEDURE — 90837 PSYTX W PT 60 MINUTES: CPT | Performed by: PSYCHOLOGIST

## 2022-08-02 RX ORDER — DEXTROAMPHETAMINE SACCHARATE, AMPHETAMINE ASPARTATE MONOHYDRATE, DEXTROAMPHETAMINE SULFATE AND AMPHETAMINE SULFATE 5; 5; 5; 5 MG/1; MG/1; MG/1; MG/1
20 CAPSULE, EXTENDED RELEASE ORAL DAILY
Qty: 30 CAPSULE | Refills: 0 | Status: SHIPPED | OUTPATIENT
Start: 2022-08-05 | End: 2022-08-30 | Stop reason: DRUGHIGH

## 2022-08-18 ENCOUNTER — BEHAVIORAL HEALTH (OUTPATIENT)
Dept: BEHAVIORAL HEALTH | Age: 37
End: 2022-08-18

## 2022-08-18 DIAGNOSIS — F90.0 ATTENTION DEFICIT HYPERACTIVITY DISORDER (ADHD), PREDOMINANTLY INATTENTIVE TYPE: ICD-10-CM

## 2022-08-18 DIAGNOSIS — F33.1 MODERATE EPISODE OF RECURRENT MAJOR DEPRESSIVE DISORDER (CMD): ICD-10-CM

## 2022-08-18 DIAGNOSIS — F43.10 PTSD (POST-TRAUMATIC STRESS DISORDER): Primary | ICD-10-CM

## 2022-08-18 PROCEDURE — 90837 PSYTX W PT 60 MINUTES: CPT | Performed by: PSYCHOLOGIST

## 2022-08-30 ENCOUNTER — BEHAVIORAL HEALTH (OUTPATIENT)
Dept: BEHAVIORAL HEALTH | Age: 37
End: 2022-08-30

## 2022-08-30 DIAGNOSIS — F90.0 ATTENTION DEFICIT HYPERACTIVITY DISORDER (ADHD), PREDOMINANTLY INATTENTIVE TYPE: Primary | ICD-10-CM

## 2022-08-30 DIAGNOSIS — F43.10 PTSD (POST-TRAUMATIC STRESS DISORDER): ICD-10-CM

## 2022-08-30 DIAGNOSIS — F33.1 MODERATE EPISODE OF RECURRENT MAJOR DEPRESSIVE DISORDER (CMD): ICD-10-CM

## 2022-08-30 PROCEDURE — 99215 OFFICE O/P EST HI 40 MIN: CPT | Performed by: REGISTERED NURSE

## 2022-08-30 RX ORDER — DEXTROAMPHETAMINE SACCHARATE, AMPHETAMINE ASPARTATE MONOHYDRATE, DEXTROAMPHETAMINE SULFATE AND AMPHETAMINE SULFATE 7.5; 7.5; 7.5; 7.5 MG/1; MG/1; MG/1; MG/1
30 CAPSULE, EXTENDED RELEASE ORAL DAILY
Qty: 30 CAPSULE | Refills: 0 | Status: SHIPPED | OUTPATIENT
Start: 2022-09-05 | End: 2022-09-30 | Stop reason: SDUPTHER

## 2022-09-02 ENCOUNTER — BEHAVIORAL HEALTH (OUTPATIENT)
Dept: BEHAVIORAL HEALTH | Age: 37
End: 2022-09-02

## 2022-09-02 DIAGNOSIS — F41.1 GENERALIZED ANXIETY DISORDER: ICD-10-CM

## 2022-09-02 DIAGNOSIS — F33.0 MAJOR DEPRESSIVE DISORDER, RECURRENT EPISODE, MILD (CMD): ICD-10-CM

## 2022-09-02 DIAGNOSIS — F43.10 PTSD (POST-TRAUMATIC STRESS DISORDER): Primary | ICD-10-CM

## 2022-09-02 PROCEDURE — 90837 PSYTX W PT 60 MINUTES: CPT | Performed by: PSYCHOLOGIST

## 2022-09-13 ENCOUNTER — BEHAVIORAL HEALTH (OUTPATIENT)
Dept: BEHAVIORAL HEALTH | Age: 37
End: 2022-09-13

## 2022-09-13 DIAGNOSIS — F43.10 PTSD (POST-TRAUMATIC STRESS DISORDER): Primary | ICD-10-CM

## 2022-09-13 DIAGNOSIS — F33.0 MAJOR DEPRESSIVE DISORDER, RECURRENT EPISODE, MILD (CMD): ICD-10-CM

## 2022-09-13 DIAGNOSIS — F41.1 GENERALIZED ANXIETY DISORDER: ICD-10-CM

## 2022-09-13 PROCEDURE — 90837 PSYTX W PT 60 MINUTES: CPT | Performed by: PSYCHOLOGIST

## 2022-09-20 ENCOUNTER — BEHAVIORAL HEALTH (OUTPATIENT)
Dept: BEHAVIORAL HEALTH | Age: 37
End: 2022-09-20

## 2022-09-20 DIAGNOSIS — F41.1 GENERALIZED ANXIETY DISORDER: ICD-10-CM

## 2022-09-20 DIAGNOSIS — F33.0 MAJOR DEPRESSIVE DISORDER, RECURRENT EPISODE, MILD (CMD): ICD-10-CM

## 2022-09-20 DIAGNOSIS — F43.10 PTSD (POST-TRAUMATIC STRESS DISORDER): Primary | ICD-10-CM

## 2022-09-20 PROCEDURE — 90837 PSYTX W PT 60 MINUTES: CPT | Performed by: PSYCHOLOGIST

## 2022-09-27 ENCOUNTER — APPOINTMENT (OUTPATIENT)
Dept: BEHAVIORAL HEALTH | Age: 37
End: 2022-09-27

## 2022-09-30 ENCOUNTER — BEHAVIORAL HEALTH (OUTPATIENT)
Dept: BEHAVIORAL HEALTH | Age: 37
End: 2022-09-30

## 2022-09-30 DIAGNOSIS — F43.10 PTSD (POST-TRAUMATIC STRESS DISORDER): Primary | ICD-10-CM

## 2022-09-30 DIAGNOSIS — F33.1 MODERATE EPISODE OF RECURRENT MAJOR DEPRESSIVE DISORDER (CMD): ICD-10-CM

## 2022-09-30 DIAGNOSIS — F90.0 ATTENTION DEFICIT HYPERACTIVITY DISORDER (ADHD), PREDOMINANTLY INATTENTIVE TYPE: ICD-10-CM

## 2022-09-30 DIAGNOSIS — F41.1 GENERALIZED ANXIETY DISORDER: ICD-10-CM

## 2022-09-30 PROCEDURE — 99215 OFFICE O/P EST HI 40 MIN: CPT | Performed by: REGISTERED NURSE

## 2022-09-30 RX ORDER — DEXTROAMPHETAMINE SACCHARATE, AMPHETAMINE ASPARTATE MONOHYDRATE, DEXTROAMPHETAMINE SULFATE AND AMPHETAMINE SULFATE 7.5; 7.5; 7.5; 7.5 MG/1; MG/1; MG/1; MG/1
30 CAPSULE, EXTENDED RELEASE ORAL DAILY
Qty: 30 CAPSULE | Refills: 0 | Status: SHIPPED | OUTPATIENT
Start: 2022-10-04 | End: 2022-10-26 | Stop reason: SDUPTHER

## 2022-09-30 RX ORDER — DEXTROAMPHETAMINE SACCHARATE, AMPHETAMINE ASPARTATE MONOHYDRATE, DEXTROAMPHETAMINE SULFATE AND AMPHETAMINE SULFATE 3.75; 3.75; 3.75; 3.75 MG/1; MG/1; MG/1; MG/1
15 CAPSULE, EXTENDED RELEASE ORAL
Qty: 30 CAPSULE | Refills: 0 | Status: SHIPPED | OUTPATIENT
Start: 2022-10-04 | End: 2022-10-26 | Stop reason: DRUGHIGH

## 2022-10-04 ENCOUNTER — E-ADVICE (OUTPATIENT)
Dept: BEHAVIORAL HEALTH | Age: 37
End: 2022-10-04

## 2022-10-04 ENCOUNTER — BEHAVIORAL HEALTH (OUTPATIENT)
Dept: BEHAVIORAL HEALTH | Age: 37
End: 2022-10-04

## 2022-10-04 DIAGNOSIS — F41.1 GENERALIZED ANXIETY DISORDER: ICD-10-CM

## 2022-10-04 DIAGNOSIS — F43.10 PTSD (POST-TRAUMATIC STRESS DISORDER): Primary | ICD-10-CM

## 2022-10-04 DIAGNOSIS — F33.0 MAJOR DEPRESSIVE DISORDER, RECURRENT EPISODE, MILD (CMD): ICD-10-CM

## 2022-10-04 PROCEDURE — 90837 PSYTX W PT 60 MINUTES: CPT | Performed by: PSYCHOLOGIST

## 2022-10-06 RX ORDER — BUPROPION HYDROCHLORIDE 150 MG/1
TABLET ORAL
Qty: 30 TABLET | Refills: 2 | OUTPATIENT
Start: 2022-10-06

## 2022-10-11 ENCOUNTER — BEHAVIORAL HEALTH (OUTPATIENT)
Dept: BEHAVIORAL HEALTH | Age: 37
End: 2022-10-11

## 2022-10-11 DIAGNOSIS — F41.1 GENERALIZED ANXIETY DISORDER: ICD-10-CM

## 2022-10-11 DIAGNOSIS — F33.0 MAJOR DEPRESSIVE DISORDER, RECURRENT EPISODE, MILD (CMD): ICD-10-CM

## 2022-10-11 DIAGNOSIS — F43.10 PTSD (POST-TRAUMATIC STRESS DISORDER): Primary | ICD-10-CM

## 2022-10-11 PROCEDURE — 90837 PSYTX W PT 60 MINUTES: CPT | Performed by: PSYCHOLOGIST

## 2022-10-18 ENCOUNTER — BEHAVIORAL HEALTH (OUTPATIENT)
Dept: BEHAVIORAL HEALTH | Age: 37
End: 2022-10-18

## 2022-10-18 DIAGNOSIS — F33.0 MAJOR DEPRESSIVE DISORDER, RECURRENT EPISODE, MILD (CMD): ICD-10-CM

## 2022-10-18 DIAGNOSIS — F43.10 PTSD (POST-TRAUMATIC STRESS DISORDER): Primary | ICD-10-CM

## 2022-10-18 DIAGNOSIS — F41.1 GENERALIZED ANXIETY DISORDER: ICD-10-CM

## 2022-10-18 PROCEDURE — 90837 PSYTX W PT 60 MINUTES: CPT | Performed by: PSYCHOLOGIST

## 2022-10-25 ENCOUNTER — BEHAVIORAL HEALTH (OUTPATIENT)
Dept: BEHAVIORAL HEALTH | Age: 37
End: 2022-10-25

## 2022-10-25 DIAGNOSIS — F33.0 MAJOR DEPRESSIVE DISORDER, RECURRENT EPISODE, MILD (CMD): ICD-10-CM

## 2022-10-25 DIAGNOSIS — F43.10 PTSD (POST-TRAUMATIC STRESS DISORDER): Primary | ICD-10-CM

## 2022-10-25 DIAGNOSIS — F41.1 GENERALIZED ANXIETY DISORDER: ICD-10-CM

## 2022-10-25 PROCEDURE — 90837 PSYTX W PT 60 MINUTES: CPT | Performed by: PSYCHOLOGIST

## 2022-10-26 ENCOUNTER — BEHAVIORAL HEALTH (OUTPATIENT)
Dept: BEHAVIORAL HEALTH | Age: 37
End: 2022-10-26

## 2022-10-26 ENCOUNTER — TELEPHONE (OUTPATIENT)
Dept: BEHAVIORAL HEALTH | Age: 37
End: 2022-10-26

## 2022-10-26 DIAGNOSIS — F33.0 MAJOR DEPRESSIVE DISORDER, RECURRENT EPISODE, MILD (CMD): ICD-10-CM

## 2022-10-26 DIAGNOSIS — F43.10 PTSD (POST-TRAUMATIC STRESS DISORDER): Primary | ICD-10-CM

## 2022-10-26 DIAGNOSIS — F90.0 ATTENTION DEFICIT HYPERACTIVITY DISORDER (ADHD), PREDOMINANTLY INATTENTIVE TYPE: ICD-10-CM

## 2022-10-26 PROCEDURE — 99215 OFFICE O/P EST HI 40 MIN: CPT | Performed by: REGISTERED NURSE

## 2022-10-26 RX ORDER — DEXTROAMPHETAMINE SACCHARATE, AMPHETAMINE ASPARTATE MONOHYDRATE, DEXTROAMPHETAMINE SULFATE AND AMPHETAMINE SULFATE 1.25; 1.25; 1.25; 1.25 MG/1; MG/1; MG/1; MG/1
5 CAPSULE, EXTENDED RELEASE ORAL DAILY
Qty: 30 CAPSULE | Refills: 0 | Status: SHIPPED | OUTPATIENT
Start: 2022-10-26 | End: 2022-10-26 | Stop reason: ALTCHOICE

## 2022-10-26 RX ORDER — DEXTROAMPHETAMINE SACCHARATE, AMPHETAMINE ASPARTATE, DEXTROAMPHETAMINE SULFATE AND AMPHETAMINE SULFATE 1.25; 1.25; 1.25; 1.25 MG/1; MG/1; MG/1; MG/1
5 TABLET ORAL DAILY
Qty: 30 TABLET | Refills: 0 | Status: SHIPPED | OUTPATIENT
Start: 2022-10-26 | End: 2022-11-18 | Stop reason: DRUGHIGH

## 2022-10-26 RX ORDER — DEXTROAMPHETAMINE SACCHARATE, AMPHETAMINE ASPARTATE MONOHYDRATE, DEXTROAMPHETAMINE SULFATE AND AMPHETAMINE SULFATE 7.5; 7.5; 7.5; 7.5 MG/1; MG/1; MG/1; MG/1
30 CAPSULE, EXTENDED RELEASE ORAL DAILY
Qty: 30 CAPSULE | Refills: 0 | Status: SHIPPED | OUTPATIENT
Start: 2022-11-04 | End: 2022-12-12 | Stop reason: SDUPTHER

## 2022-11-01 ENCOUNTER — BEHAVIORAL HEALTH (OUTPATIENT)
Dept: BEHAVIORAL HEALTH | Age: 37
End: 2022-11-01

## 2022-11-01 DIAGNOSIS — F33.0 MAJOR DEPRESSIVE DISORDER, RECURRENT EPISODE, MILD (CMD): ICD-10-CM

## 2022-11-01 DIAGNOSIS — F41.1 GENERALIZED ANXIETY DISORDER: ICD-10-CM

## 2022-11-01 DIAGNOSIS — F43.10 PTSD (POST-TRAUMATIC STRESS DISORDER): Primary | ICD-10-CM

## 2022-11-01 PROCEDURE — 90837 PSYTX W PT 60 MINUTES: CPT | Performed by: PSYCHOLOGIST

## 2022-11-08 ENCOUNTER — BEHAVIORAL HEALTH (OUTPATIENT)
Dept: BEHAVIORAL HEALTH | Age: 37
End: 2022-11-08

## 2022-11-08 DIAGNOSIS — F41.1 GENERALIZED ANXIETY DISORDER: ICD-10-CM

## 2022-11-08 DIAGNOSIS — F43.10 PTSD (POST-TRAUMATIC STRESS DISORDER): Primary | ICD-10-CM

## 2022-11-08 DIAGNOSIS — F33.0 MAJOR DEPRESSIVE DISORDER, RECURRENT EPISODE, MILD (CMD): ICD-10-CM

## 2022-11-08 PROCEDURE — 90837 PSYTX W PT 60 MINUTES: CPT | Performed by: PSYCHOLOGIST

## 2022-11-10 ENCOUNTER — OFFICE VISIT (OUTPATIENT)
Dept: FAMILY MEDICINE | Age: 37
End: 2022-11-10

## 2022-11-10 ENCOUNTER — LAB SERVICES (OUTPATIENT)
Dept: LAB | Age: 37
End: 2022-11-10

## 2022-11-10 VITALS
RESPIRATION RATE: 16 BRPM | OXYGEN SATURATION: 100 % | HEIGHT: 68 IN | HEART RATE: 72 BPM | DIASTOLIC BLOOD PRESSURE: 70 MMHG | TEMPERATURE: 98.3 F | WEIGHT: 163 LBS | BODY MASS INDEX: 24.71 KG/M2 | SYSTOLIC BLOOD PRESSURE: 106 MMHG

## 2022-11-10 DIAGNOSIS — R53.83 FATIGUE, UNSPECIFIED TYPE: ICD-10-CM

## 2022-11-10 DIAGNOSIS — Z30.09 VASECTOMY EVALUATION: ICD-10-CM

## 2022-11-10 DIAGNOSIS — Z00.00 ANNUAL PHYSICAL EXAM: ICD-10-CM

## 2022-11-10 DIAGNOSIS — Z23 NEED FOR VACCINATION: ICD-10-CM

## 2022-11-10 DIAGNOSIS — Z00.00 ANNUAL PHYSICAL EXAM: Primary | ICD-10-CM

## 2022-11-10 PROBLEM — F90.0 ATTENTION DEFICIT HYPERACTIVITY DISORDER (ADHD), PREDOMINANTLY INATTENTIVE TYPE: Status: ACTIVE | Noted: 2022-11-10

## 2022-11-10 PROBLEM — F32.0 MILD MAJOR DEPRESSION (CMD): Status: ACTIVE | Noted: 2022-11-10

## 2022-11-10 LAB
BASOPHILS # BLD: 0.1 K/MCL (ref 0–0.3)
BASOPHILS NFR BLD: 1 %
DEPRECATED RDW RBC: 41.1 FL (ref 39–50)
EOSINOPHIL # BLD: 0 K/MCL (ref 0–0.5)
EOSINOPHIL NFR BLD: 0 %
ERYTHROCYTE [DISTWIDTH] IN BLOOD: 12.7 % (ref 11–15)
HCT VFR BLD CALC: 46.3 % (ref 39–51)
HGB BLD-MCNC: 15.9 G/DL (ref 13–17)
IMM GRANULOCYTES # BLD AUTO: 0 K/MCL (ref 0–0.2)
IMM GRANULOCYTES # BLD: 0 %
LYMPHOCYTES # BLD: 1.5 K/MCL (ref 1–4.8)
LYMPHOCYTES NFR BLD: 33 %
MCH RBC QN AUTO: 30.1 PG (ref 26–34)
MCHC RBC AUTO-ENTMCNC: 34.3 G/DL (ref 32–36.5)
MCV RBC AUTO: 87.7 FL (ref 78–100)
MONOCYTES # BLD: 0.3 K/MCL (ref 0.3–0.9)
MONOCYTES NFR BLD: 7 %
NEUTROPHILS # BLD: 2.7 K/MCL (ref 1.8–7.7)
NEUTROPHILS NFR BLD: 59 %
PLATELET # BLD AUTO: 240 K/MCL (ref 140–450)
RBC # BLD: 5.28 MIL/MCL (ref 4.5–5.9)
WBC # BLD: 4.6 K/MCL (ref 4.2–11)

## 2022-11-10 PROCEDURE — 80053 COMPREHEN METABOLIC PANEL: CPT | Performed by: INTERNAL MEDICINE

## 2022-11-10 PROCEDURE — 90471 IMMUNIZATION ADMIN: CPT | Performed by: FAMILY MEDICINE

## 2022-11-10 PROCEDURE — 85025 COMPLETE CBC W/AUTO DIFF WBC: CPT | Performed by: INTERNAL MEDICINE

## 2022-11-10 PROCEDURE — 99395 PREV VISIT EST AGE 18-39: CPT | Performed by: FAMILY MEDICINE

## 2022-11-10 PROCEDURE — 80061 LIPID PANEL: CPT | Performed by: INTERNAL MEDICINE

## 2022-11-10 PROCEDURE — 82306 VITAMIN D 25 HYDROXY: CPT | Performed by: INTERNAL MEDICINE

## 2022-11-10 PROCEDURE — 90686 IIV4 VACC NO PRSV 0.5 ML IM: CPT | Performed by: FAMILY MEDICINE

## 2022-11-10 PROCEDURE — 36415 COLL VENOUS BLD VENIPUNCTURE: CPT | Performed by: INTERNAL MEDICINE

## 2022-11-10 ASSESSMENT — PATIENT HEALTH QUESTIONNAIRE - PHQ9
SUM OF ALL RESPONSES TO PHQ9 QUESTIONS 1 AND 2: 0
2. FEELING DOWN, DEPRESSED OR HOPELESS: NOT AT ALL
SUM OF ALL RESPONSES TO PHQ9 QUESTIONS 1 AND 2: 0
1. LITTLE INTEREST OR PLEASURE IN DOING THINGS: NOT AT ALL
CLINICAL INTERPRETATION OF PHQ2 SCORE: NO FURTHER SCREENING NEEDED

## 2022-11-10 ASSESSMENT — PAIN SCALES - GENERAL: PAINLEVEL: 0

## 2022-11-11 ENCOUNTER — TELEPHONE (OUTPATIENT)
Dept: FAMILY MEDICINE | Age: 37
End: 2022-11-11

## 2022-11-11 LAB
25(OH)D3+25(OH)D2 SERPL-MCNC: 22.4 NG/ML (ref 30–100)
ALBUMIN SERPL-MCNC: 4.6 G/DL (ref 3.6–5.1)
ALBUMIN/GLOB SERPL: 1.4 {RATIO} (ref 1–2.4)
ALP SERPL-CCNC: 48 UNITS/L (ref 45–117)
ALT SERPL-CCNC: 30 UNITS/L
ANION GAP SERPL CALC-SCNC: 11 MMOL/L (ref 7–19)
AST SERPL-CCNC: 20 UNITS/L
BILIRUB SERPL-MCNC: 0.5 MG/DL (ref 0.2–1)
BUN SERPL-MCNC: 16 MG/DL (ref 6–20)
BUN/CREAT SERPL: 15 (ref 7–25)
CALCIUM SERPL-MCNC: 9.8 MG/DL (ref 8.4–10.2)
CHLORIDE SERPL-SCNC: 101 MMOL/L (ref 97–110)
CHOLEST SERPL-MCNC: 196 MG/DL
CHOLEST/HDLC SERPL: 2.9 {RATIO}
CO2 SERPL-SCNC: 27 MMOL/L (ref 21–32)
CREAT SERPL-MCNC: 1.08 MG/DL (ref 0.67–1.17)
FASTING DURATION TIME PATIENT: NORMAL H
FASTING DURATION TIME PATIENT: NORMAL H
GFR SERPLBLD BASED ON 1.73 SQ M-ARVRAT: >90 ML/MIN
GLOBULIN SER-MCNC: 3.2 G/DL (ref 2–4)
GLUCOSE SERPL-MCNC: 73 MG/DL (ref 70–99)
HDLC SERPL-MCNC: 67 MG/DL
LDLC SERPL CALC-MCNC: 116 MG/DL
NONHDLC SERPL-MCNC: 129 MG/DL
POTASSIUM SERPL-SCNC: 4.1 MMOL/L (ref 3.4–5.1)
PROT SERPL-MCNC: 7.8 G/DL (ref 6.4–8.2)
SODIUM SERPL-SCNC: 135 MMOL/L (ref 135–145)
TRIGL SERPL-MCNC: 63 MG/DL

## 2022-11-15 ENCOUNTER — BEHAVIORAL HEALTH (OUTPATIENT)
Dept: BEHAVIORAL HEALTH | Age: 37
End: 2022-11-15

## 2022-11-15 DIAGNOSIS — F43.10 PTSD (POST-TRAUMATIC STRESS DISORDER): Primary | ICD-10-CM

## 2022-11-15 DIAGNOSIS — F33.0 MAJOR DEPRESSIVE DISORDER, RECURRENT EPISODE, MILD (CMD): ICD-10-CM

## 2022-11-15 DIAGNOSIS — F41.1 GENERALIZED ANXIETY DISORDER: ICD-10-CM

## 2022-11-15 PROCEDURE — 90837 PSYTX W PT 60 MINUTES: CPT | Performed by: PSYCHOLOGIST

## 2022-11-16 ENCOUNTER — E-ADVICE (OUTPATIENT)
Dept: FAMILY MEDICINE | Age: 37
End: 2022-11-16

## 2022-11-17 ENCOUNTER — E-ADVICE (OUTPATIENT)
Dept: BEHAVIORAL HEALTH | Age: 37
End: 2022-11-17

## 2022-11-17 RX ORDER — DEXTROAMPHETAMINE SACCHARATE, AMPHETAMINE ASPARTATE, DEXTROAMPHETAMINE SULFATE AND AMPHETAMINE SULFATE 1.25; 1.25; 1.25; 1.25 MG/1; MG/1; MG/1; MG/1
5 TABLET ORAL DAILY
Qty: 30 TABLET | Refills: 0 | Status: CANCELLED | OUTPATIENT
Start: 2022-11-17

## 2022-11-18 RX ORDER — DEXTROAMPHETAMINE SACCHARATE, AMPHETAMINE ASPARTATE, DEXTROAMPHETAMINE SULFATE AND AMPHETAMINE SULFATE 2.5; 2.5; 2.5; 2.5 MG/1; MG/1; MG/1; MG/1
10 TABLET ORAL DAILY
Qty: 30 TABLET | Refills: 0 | Status: SHIPPED | OUTPATIENT
Start: 2022-11-26 | End: 2022-12-21 | Stop reason: SDUPTHER

## 2022-11-22 ENCOUNTER — BEHAVIORAL HEALTH (OUTPATIENT)
Dept: BEHAVIORAL HEALTH | Age: 37
End: 2022-11-22

## 2022-11-22 DIAGNOSIS — F33.0 MAJOR DEPRESSIVE DISORDER, RECURRENT EPISODE, MILD (CMD): ICD-10-CM

## 2022-11-22 DIAGNOSIS — F43.10 PTSD (POST-TRAUMATIC STRESS DISORDER): Primary | ICD-10-CM

## 2022-11-22 DIAGNOSIS — F41.1 GENERALIZED ANXIETY DISORDER: ICD-10-CM

## 2022-11-22 PROCEDURE — 90837 PSYTX W PT 60 MINUTES: CPT | Performed by: PSYCHOLOGIST

## 2022-11-29 ENCOUNTER — BEHAVIORAL HEALTH (OUTPATIENT)
Dept: BEHAVIORAL HEALTH | Age: 37
End: 2022-11-29

## 2022-11-29 DIAGNOSIS — F41.1 GENERALIZED ANXIETY DISORDER: ICD-10-CM

## 2022-11-29 DIAGNOSIS — F43.10 PTSD (POST-TRAUMATIC STRESS DISORDER): Primary | ICD-10-CM

## 2022-11-29 DIAGNOSIS — F33.0 MAJOR DEPRESSIVE DISORDER, RECURRENT EPISODE, MILD (CMD): ICD-10-CM

## 2022-11-29 PROCEDURE — 90837 PSYTX W PT 60 MINUTES: CPT | Performed by: PSYCHOLOGIST

## 2022-12-06 ENCOUNTER — APPOINTMENT (OUTPATIENT)
Dept: BEHAVIORAL HEALTH | Age: 37
End: 2022-12-06

## 2022-12-08 ENCOUNTER — BEHAVIORAL HEALTH (OUTPATIENT)
Dept: BEHAVIORAL HEALTH | Age: 37
End: 2022-12-08

## 2022-12-08 DIAGNOSIS — F43.10 PTSD (POST-TRAUMATIC STRESS DISORDER): Primary | ICD-10-CM

## 2022-12-08 DIAGNOSIS — F41.1 GENERALIZED ANXIETY DISORDER: ICD-10-CM

## 2022-12-08 DIAGNOSIS — F33.0 MAJOR DEPRESSIVE DISORDER, RECURRENT EPISODE, MILD (CMD): ICD-10-CM

## 2022-12-08 PROCEDURE — 90837 PSYTX W PT 60 MINUTES: CPT | Performed by: PSYCHOLOGIST

## 2022-12-09 ENCOUNTER — TELEPHONE (OUTPATIENT)
Dept: UROLOGY | Age: 37
End: 2022-12-09

## 2022-12-13 ENCOUNTER — TELEPHONE (OUTPATIENT)
Dept: BEHAVIORAL HEALTH | Age: 37
End: 2022-12-13

## 2022-12-15 RX ORDER — DEXTROAMPHETAMINE SACCHARATE, AMPHETAMINE ASPARTATE MONOHYDRATE, DEXTROAMPHETAMINE SULFATE AND AMPHETAMINE SULFATE 7.5; 7.5; 7.5; 7.5 MG/1; MG/1; MG/1; MG/1
30 CAPSULE, EXTENDED RELEASE ORAL DAILY
Qty: 30 CAPSULE | Refills: 0 | Status: SHIPPED | OUTPATIENT
Start: 2022-12-15 | End: 2022-12-21 | Stop reason: SDUPTHER

## 2022-12-21 ENCOUNTER — BEHAVIORAL HEALTH (OUTPATIENT)
Dept: BEHAVIORAL HEALTH | Age: 37
End: 2022-12-21

## 2022-12-21 DIAGNOSIS — F33.0 MAJOR DEPRESSIVE DISORDER, RECURRENT EPISODE, MILD (CMD): ICD-10-CM

## 2022-12-21 DIAGNOSIS — F43.10 PTSD (POST-TRAUMATIC STRESS DISORDER): ICD-10-CM

## 2022-12-21 DIAGNOSIS — F90.0 ATTENTION DEFICIT HYPERACTIVITY DISORDER (ADHD), PREDOMINANTLY INATTENTIVE TYPE: Primary | ICD-10-CM

## 2022-12-21 PROCEDURE — 99215 OFFICE O/P EST HI 40 MIN: CPT | Performed by: REGISTERED NURSE

## 2022-12-21 RX ORDER — DEXTROAMPHETAMINE SACCHARATE, AMPHETAMINE ASPARTATE, DEXTROAMPHETAMINE SULFATE AND AMPHETAMINE SULFATE 2.5; 2.5; 2.5; 2.5 MG/1; MG/1; MG/1; MG/1
10 TABLET ORAL DAILY
Qty: 30 TABLET | Refills: 0 | Status: SHIPPED | OUTPATIENT
Start: 2022-12-26 | End: 2023-02-01 | Stop reason: ALTCHOICE

## 2022-12-21 RX ORDER — DEXTROAMPHETAMINE SACCHARATE, AMPHETAMINE ASPARTATE MONOHYDRATE, DEXTROAMPHETAMINE SULFATE AND AMPHETAMINE SULFATE 7.5; 7.5; 7.5; 7.5 MG/1; MG/1; MG/1; MG/1
30 CAPSULE, EXTENDED RELEASE ORAL DAILY
Qty: 30 CAPSULE | Refills: 0 | Status: SHIPPED | OUTPATIENT
Start: 2023-01-18 | End: 2023-02-01 | Stop reason: ALTCHOICE

## 2022-12-21 RX ORDER — SERTRALINE HYDROCHLORIDE 25 MG/1
25 TABLET, FILM COATED ORAL DAILY
Qty: 30 TABLET | Refills: 2 | Status: SHIPPED
Start: 2022-12-21 | End: 2023-04-05 | Stop reason: ALTCHOICE

## 2022-12-22 ENCOUNTER — BEHAVIORAL HEALTH (OUTPATIENT)
Dept: BEHAVIORAL HEALTH | Age: 37
End: 2022-12-22

## 2022-12-22 DIAGNOSIS — F41.1 GENERALIZED ANXIETY DISORDER: ICD-10-CM

## 2022-12-22 DIAGNOSIS — F43.10 PTSD (POST-TRAUMATIC STRESS DISORDER): Primary | ICD-10-CM

## 2022-12-22 DIAGNOSIS — F33.0 MAJOR DEPRESSIVE DISORDER, RECURRENT EPISODE, MILD (CMD): ICD-10-CM

## 2022-12-22 PROCEDURE — 90853 GROUP PSYCHOTHERAPY: CPT | Performed by: PSYCHIATRY & NEUROLOGY

## 2022-12-29 ENCOUNTER — APPOINTMENT (OUTPATIENT)
Dept: BEHAVIORAL HEALTH | Age: 37
End: 2022-12-29

## 2022-12-30 PROBLEM — F33.0 MAJOR DEPRESSIVE DISORDER, RECURRENT EPISODE, MILD (CMD): Status: ACTIVE | Noted: 2022-12-30

## 2022-12-30 PROBLEM — F43.10 PTSD (POST-TRAUMATIC STRESS DISORDER): Status: ACTIVE | Noted: 2022-12-30

## 2023-01-12 ENCOUNTER — BEHAVIORAL HEALTH (OUTPATIENT)
Dept: BEHAVIORAL HEALTH | Age: 38
End: 2023-01-12

## 2023-01-12 DIAGNOSIS — F33.0 MAJOR DEPRESSIVE DISORDER, RECURRENT EPISODE, MILD (CMD): ICD-10-CM

## 2023-01-12 DIAGNOSIS — F43.10 PTSD (POST-TRAUMATIC STRESS DISORDER): Primary | ICD-10-CM

## 2023-01-12 PROCEDURE — 90853 GROUP PSYCHOTHERAPY: CPT | Performed by: PSYCHIATRY & NEUROLOGY

## 2023-01-19 ENCOUNTER — BEHAVIORAL HEALTH (OUTPATIENT)
Dept: BEHAVIORAL HEALTH | Age: 38
End: 2023-01-19

## 2023-01-19 ENCOUNTER — OFFICE VISIT (OUTPATIENT)
Dept: UROLOGY | Age: 38
End: 2023-01-19
Attending: FAMILY MEDICINE

## 2023-01-19 ENCOUNTER — APPOINTMENT (OUTPATIENT)
Dept: BEHAVIORAL HEALTH | Age: 38
End: 2023-01-19

## 2023-01-19 VITALS
SYSTOLIC BLOOD PRESSURE: 132 MMHG | HEART RATE: 59 BPM | OXYGEN SATURATION: 99 % | DIASTOLIC BLOOD PRESSURE: 84 MMHG | TEMPERATURE: 97.3 F

## 2023-01-19 DIAGNOSIS — Z30.09 STERILIZATION CONSULT: Primary | ICD-10-CM

## 2023-01-19 DIAGNOSIS — F43.10 PTSD (POST-TRAUMATIC STRESS DISORDER): Primary | ICD-10-CM

## 2023-01-19 DIAGNOSIS — F33.0 MAJOR DEPRESSIVE DISORDER, RECURRENT EPISODE, MILD (CMD): ICD-10-CM

## 2023-01-19 DIAGNOSIS — F41.1 GENERALIZED ANXIETY DISORDER: ICD-10-CM

## 2023-01-19 PROCEDURE — 99203 OFFICE O/P NEW LOW 30 MIN: CPT | Performed by: UROLOGY

## 2023-01-19 PROCEDURE — 90837 PSYTX W PT 60 MINUTES: CPT | Performed by: PSYCHOLOGIST

## 2023-02-01 ENCOUNTER — BEHAVIORAL HEALTH (OUTPATIENT)
Dept: BEHAVIORAL HEALTH | Age: 38
End: 2023-02-01

## 2023-02-01 DIAGNOSIS — F90.0 ATTENTION DEFICIT HYPERACTIVITY DISORDER (ADHD), PREDOMINANTLY INATTENTIVE TYPE: ICD-10-CM

## 2023-02-01 DIAGNOSIS — F33.0 MAJOR DEPRESSIVE DISORDER, RECURRENT EPISODE, MILD (CMD): Primary | ICD-10-CM

## 2023-02-01 DIAGNOSIS — F43.10 PTSD (POST-TRAUMATIC STRESS DISORDER): ICD-10-CM

## 2023-02-01 PROCEDURE — 99215 OFFICE O/P EST HI 40 MIN: CPT | Performed by: REGISTERED NURSE

## 2023-02-01 RX ORDER — CLONIDINE HYDROCHLORIDE 0.1 MG/1
0.1 TABLET ORAL 2 TIMES DAILY
Qty: 60 TABLET | Refills: 2 | Status: SHIPPED | OUTPATIENT
Start: 2023-02-01 | End: 2023-04-21 | Stop reason: SDUPTHER

## 2023-02-02 ENCOUNTER — APPOINTMENT (OUTPATIENT)
Dept: BEHAVIORAL HEALTH | Age: 38
End: 2023-02-02

## 2023-02-07 RX ORDER — METHOCARBAMOL 750 MG/1
TABLET ORAL
Qty: 4 CAPSULE | Refills: 2 | OUTPATIENT
Start: 2023-02-07

## 2023-02-09 ENCOUNTER — APPOINTMENT (OUTPATIENT)
Dept: BEHAVIORAL HEALTH | Age: 38
End: 2023-02-09

## 2023-02-16 ENCOUNTER — EXTERNAL RECORD (OUTPATIENT)
Dept: HEALTH INFORMATION MANAGEMENT | Facility: OTHER | Age: 38
End: 2023-02-16

## 2023-02-16 ENCOUNTER — APPOINTMENT (OUTPATIENT)
Dept: BEHAVIORAL HEALTH | Age: 38
End: 2023-02-16

## 2023-02-20 ENCOUNTER — E-ADVICE (OUTPATIENT)
Dept: UROLOGY | Age: 38
End: 2023-02-20

## 2023-02-21 ENCOUNTER — TELEPHONE (OUTPATIENT)
Dept: UROLOGY | Age: 38
End: 2023-02-21

## 2023-02-21 RX ORDER — DIAZEPAM 10 MG/1
10 TABLET ORAL
Qty: 1 TABLET | Refills: 0 | Status: SHIPPED | OUTPATIENT
Start: 2023-02-21 | End: 2023-04-05 | Stop reason: ALTCHOICE

## 2023-02-22 ENCOUNTER — TELEPHONE (OUTPATIENT)
Dept: BEHAVIORAL HEALTH | Age: 38
End: 2023-02-22

## 2023-02-23 ENCOUNTER — APPOINTMENT (OUTPATIENT)
Dept: BEHAVIORAL HEALTH | Age: 38
End: 2023-02-23

## 2023-02-23 ENCOUNTER — OFFICE VISIT (OUTPATIENT)
Dept: UROLOGY | Age: 38
End: 2023-02-23

## 2023-02-23 VITALS
OXYGEN SATURATION: 98 % | RESPIRATION RATE: 16 BRPM | DIASTOLIC BLOOD PRESSURE: 80 MMHG | SYSTOLIC BLOOD PRESSURE: 108 MMHG | HEART RATE: 65 BPM

## 2023-02-23 DIAGNOSIS — Z30.09 STERILIZATION CONSULT: Primary | ICD-10-CM

## 2023-02-23 PROCEDURE — 55250 REMOVAL OF SPERM DUCT(S): CPT | Performed by: UROLOGY

## 2023-03-02 ENCOUNTER — BEHAVIORAL HEALTH (OUTPATIENT)
Dept: BEHAVIORAL HEALTH | Age: 38
End: 2023-03-02

## 2023-03-02 DIAGNOSIS — F33.0 MAJOR DEPRESSIVE DISORDER, RECURRENT EPISODE, MILD (CMD): Primary | ICD-10-CM

## 2023-03-02 DIAGNOSIS — F43.10 PTSD (POST-TRAUMATIC STRESS DISORDER): ICD-10-CM

## 2023-03-02 PROCEDURE — 90837 PSYTX W PT 60 MINUTES: CPT | Performed by: PSYCHOLOGIST

## 2023-03-07 ENCOUNTER — BEHAVIORAL HEALTH (OUTPATIENT)
Dept: BEHAVIORAL HEALTH | Age: 38
End: 2023-03-07

## 2023-03-07 DIAGNOSIS — F33.0 MAJOR DEPRESSIVE DISORDER, RECURRENT EPISODE, MILD (CMD): ICD-10-CM

## 2023-03-07 DIAGNOSIS — F33.0 MAJOR DEPRESSIVE DISORDER, RECURRENT EPISODE, MILD (CMD): Primary | ICD-10-CM

## 2023-03-07 DIAGNOSIS — F43.10 PTSD (POST-TRAUMATIC STRESS DISORDER): ICD-10-CM

## 2023-03-07 DIAGNOSIS — F43.10 PTSD (POST-TRAUMATIC STRESS DISORDER): Primary | ICD-10-CM

## 2023-03-07 PROCEDURE — 90853 GROUP PSYCHOTHERAPY: CPT | Performed by: PSYCHOLOGIST

## 2023-03-07 PROCEDURE — 90853 GROUP PSYCHOTHERAPY: CPT | Performed by: PSYCHIATRY & NEUROLOGY

## 2023-03-08 ENCOUNTER — APPOINTMENT (OUTPATIENT)
Dept: BEHAVIORAL HEALTH | Age: 38
End: 2023-03-08

## 2023-03-14 ENCOUNTER — BEHAVIORAL HEALTH (OUTPATIENT)
Dept: BEHAVIORAL HEALTH | Age: 38
End: 2023-03-14

## 2023-03-14 DIAGNOSIS — F41.1 GENERALIZED ANXIETY DISORDER: ICD-10-CM

## 2023-03-14 DIAGNOSIS — F43.10 PTSD (POST-TRAUMATIC STRESS DISORDER): ICD-10-CM

## 2023-03-14 DIAGNOSIS — F33.0 MAJOR DEPRESSIVE DISORDER, RECURRENT EPISODE, MILD (CMD): Primary | ICD-10-CM

## 2023-03-14 PROCEDURE — 90837 PSYTX W PT 60 MINUTES: CPT | Performed by: PSYCHOLOGIST

## 2023-03-14 PROCEDURE — 90853 GROUP PSYCHOTHERAPY: CPT | Performed by: PSYCHOLOGIST

## 2023-03-28 ENCOUNTER — BEHAVIORAL HEALTH (OUTPATIENT)
Dept: BEHAVIORAL HEALTH | Age: 38
End: 2023-03-28

## 2023-03-28 DIAGNOSIS — F41.1 GENERALIZED ANXIETY DISORDER: ICD-10-CM

## 2023-03-28 DIAGNOSIS — F33.0 MAJOR DEPRESSIVE DISORDER, RECURRENT EPISODE, MILD (CMD): ICD-10-CM

## 2023-03-28 DIAGNOSIS — F43.10 PTSD (POST-TRAUMATIC STRESS DISORDER): Primary | ICD-10-CM

## 2023-03-28 PROCEDURE — 90837 PSYTX W PT 60 MINUTES: CPT | Performed by: PSYCHOLOGIST

## 2023-04-04 ENCOUNTER — BEHAVIORAL HEALTH (OUTPATIENT)
Dept: BEHAVIORAL HEALTH | Age: 38
End: 2023-04-04

## 2023-04-04 DIAGNOSIS — F41.1 GENERALIZED ANXIETY DISORDER: ICD-10-CM

## 2023-04-04 DIAGNOSIS — F33.0 MAJOR DEPRESSIVE DISORDER, RECURRENT EPISODE, MILD (CMD): Primary | ICD-10-CM

## 2023-04-04 DIAGNOSIS — F43.10 PTSD (POST-TRAUMATIC STRESS DISORDER): ICD-10-CM

## 2023-04-04 PROCEDURE — 90853 GROUP PSYCHOTHERAPY: CPT | Performed by: PSYCHOLOGIST

## 2023-04-05 ENCOUNTER — BEHAVIORAL HEALTH (OUTPATIENT)
Dept: BEHAVIORAL HEALTH | Age: 38
End: 2023-04-05

## 2023-04-05 DIAGNOSIS — F90.0 ATTENTION DEFICIT HYPERACTIVITY DISORDER (ADHD), PREDOMINANTLY INATTENTIVE TYPE: Primary | ICD-10-CM

## 2023-04-05 DIAGNOSIS — F43.10 PTSD (POST-TRAUMATIC STRESS DISORDER): ICD-10-CM

## 2023-04-05 DIAGNOSIS — F33.42 RECURRENT MAJOR DEPRESSIVE DISORDER, IN FULL REMISSION (CMD): ICD-10-CM

## 2023-04-05 PROCEDURE — 99215 OFFICE O/P EST HI 40 MIN: CPT | Performed by: REGISTERED NURSE

## 2023-04-05 RX ORDER — LISDEXAMFETAMINE DIMESYLATE CAPSULES 20 MG/1
20 CAPSULE ORAL EVERY MORNING
Qty: 30 CAPSULE | Refills: 0 | Status: SHIPPED | OUTPATIENT
Start: 2023-04-05 | End: 2023-05-02 | Stop reason: DRUGHIGH

## 2023-04-06 ENCOUNTER — TELEPHONE (OUTPATIENT)
Dept: BEHAVIORAL HEALTH | Age: 38
End: 2023-04-06

## 2023-04-06 PROBLEM — F33.42 RECURRENT MAJOR DEPRESSIVE DISORDER, IN FULL REMISSION (CMD): Status: ACTIVE | Noted: 2023-04-06

## 2023-04-11 ENCOUNTER — BEHAVIORAL HEALTH (OUTPATIENT)
Dept: BEHAVIORAL HEALTH | Age: 38
End: 2023-04-11

## 2023-04-11 DIAGNOSIS — F33.42 RECURRENT MAJOR DEPRESSIVE DISORDER, IN FULL REMISSION (CMD): ICD-10-CM

## 2023-04-11 DIAGNOSIS — F41.1 GENERALIZED ANXIETY DISORDER: ICD-10-CM

## 2023-04-11 DIAGNOSIS — F43.10 PTSD (POST-TRAUMATIC STRESS DISORDER): Primary | ICD-10-CM

## 2023-04-11 PROCEDURE — 90837 PSYTX W PT 60 MINUTES: CPT | Performed by: PSYCHOLOGIST

## 2023-04-18 ENCOUNTER — BEHAVIORAL HEALTH (OUTPATIENT)
Dept: BEHAVIORAL HEALTH | Age: 38
End: 2023-04-18

## 2023-04-18 DIAGNOSIS — F41.1 GENERALIZED ANXIETY DISORDER: ICD-10-CM

## 2023-04-18 DIAGNOSIS — F33.42 MAJOR DEPRESSIVE DISORDER, RECURRENT EPISODE, IN FULL REMISSION (CMD): ICD-10-CM

## 2023-04-18 DIAGNOSIS — F43.10 PTSD (POST-TRAUMATIC STRESS DISORDER): Primary | ICD-10-CM

## 2023-04-18 PROCEDURE — 90853 GROUP PSYCHOTHERAPY: CPT | Performed by: PSYCHOLOGIST

## 2023-04-21 RX ORDER — CLONIDINE HYDROCHLORIDE 0.1 MG/1
0.1 TABLET ORAL 2 TIMES DAILY
Qty: 60 TABLET | Refills: 2 | Status: SHIPPED | OUTPATIENT
Start: 2023-04-21 | End: 2023-06-13 | Stop reason: DRUGHIGH

## 2023-04-25 ENCOUNTER — BEHAVIORAL HEALTH (OUTPATIENT)
Dept: BEHAVIORAL HEALTH | Age: 38
End: 2023-04-25

## 2023-04-25 ENCOUNTER — APPOINTMENT (OUTPATIENT)
Dept: BEHAVIORAL HEALTH | Age: 38
End: 2023-04-25

## 2023-04-25 DIAGNOSIS — F43.10 PTSD (POST-TRAUMATIC STRESS DISORDER): Primary | ICD-10-CM

## 2023-04-25 DIAGNOSIS — F33.0 MAJOR DEPRESSIVE DISORDER, RECURRENT EPISODE, MILD (CMD): ICD-10-CM

## 2023-04-25 DIAGNOSIS — F41.1 GENERALIZED ANXIETY DISORDER: ICD-10-CM

## 2023-04-25 PROCEDURE — 90837 PSYTX W PT 60 MINUTES: CPT | Performed by: PSYCHOLOGIST

## 2023-05-02 ENCOUNTER — BEHAVIORAL HEALTH (OUTPATIENT)
Dept: BEHAVIORAL HEALTH | Age: 38
End: 2023-05-02

## 2023-05-02 DIAGNOSIS — F33.0 MAJOR DEPRESSIVE DISORDER, RECURRENT EPISODE, MILD (CMD): ICD-10-CM

## 2023-05-02 DIAGNOSIS — F90.0 ATTENTION DEFICIT HYPERACTIVITY DISORDER (ADHD), PREDOMINANTLY INATTENTIVE TYPE: ICD-10-CM

## 2023-05-02 DIAGNOSIS — F43.10 PTSD (POST-TRAUMATIC STRESS DISORDER): Primary | ICD-10-CM

## 2023-05-02 PROCEDURE — 99215 OFFICE O/P EST HI 40 MIN: CPT | Performed by: REGISTERED NURSE

## 2023-05-02 RX ORDER — LISDEXAMFETAMINE DIMESYLATE CAPSULES 30 MG/1
30 CAPSULE ORAL EVERY MORNING
Qty: 30 CAPSULE | Refills: 0 | Status: SHIPPED | OUTPATIENT
Start: 2023-05-05 | End: 2023-06-05 | Stop reason: SDUPTHER

## 2023-05-09 ENCOUNTER — BEHAVIORAL HEALTH (OUTPATIENT)
Dept: BEHAVIORAL HEALTH | Age: 38
End: 2023-05-09

## 2023-05-09 DIAGNOSIS — F41.1 GENERALIZED ANXIETY DISORDER: ICD-10-CM

## 2023-05-09 DIAGNOSIS — F43.10 PTSD (POST-TRAUMATIC STRESS DISORDER): Primary | ICD-10-CM

## 2023-05-09 DIAGNOSIS — F33.0 MAJOR DEPRESSIVE DISORDER, RECURRENT EPISODE, MILD (CMD): ICD-10-CM

## 2023-05-09 PROCEDURE — 90837 PSYTX W PT 60 MINUTES: CPT | Performed by: PSYCHOLOGIST

## 2023-05-10 ENCOUNTER — OFF PREMISE (OUTPATIENT)
Dept: OCCUPATIONAL MEDICINE | Age: 38
End: 2023-05-10

## 2023-05-10 DIAGNOSIS — Z02.89 VISIT FOR OCCUPATIONAL HEALTH EXAMINATION: Primary | ICD-10-CM

## 2023-05-10 PROCEDURE — OH071 RESPIRATORY (PFT) QUESTIONNAIRE: Performed by: PHYSICIAN ASSISTANT

## 2023-05-23 ENCOUNTER — BEHAVIORAL HEALTH (OUTPATIENT)
Dept: BEHAVIORAL HEALTH | Age: 38
End: 2023-05-23

## 2023-05-23 DIAGNOSIS — F43.10 PTSD (POST-TRAUMATIC STRESS DISORDER): Primary | ICD-10-CM

## 2023-05-23 DIAGNOSIS — F33.0 MAJOR DEPRESSIVE DISORDER, RECURRENT EPISODE, MILD (CMD): ICD-10-CM

## 2023-05-23 DIAGNOSIS — F41.1 GENERALIZED ANXIETY DISORDER: ICD-10-CM

## 2023-05-23 PROCEDURE — 90837 PSYTX W PT 60 MINUTES: CPT | Performed by: PSYCHOLOGIST

## 2023-05-30 ENCOUNTER — BEHAVIORAL HEALTH (OUTPATIENT)
Dept: BEHAVIORAL HEALTH | Age: 38
End: 2023-05-30

## 2023-05-30 DIAGNOSIS — F41.1 GENERALIZED ANXIETY DISORDER: ICD-10-CM

## 2023-05-30 DIAGNOSIS — F33.0 MAJOR DEPRESSIVE DISORDER, RECURRENT EPISODE, MILD (CMD): ICD-10-CM

## 2023-05-30 DIAGNOSIS — F43.10 PTSD (POST-TRAUMATIC STRESS DISORDER): Primary | ICD-10-CM

## 2023-05-30 PROCEDURE — 90853 GROUP PSYCHOTHERAPY: CPT | Performed by: PSYCHOLOGIST

## 2023-06-01 ENCOUNTER — E-ADVICE (OUTPATIENT)
Dept: BEHAVIORAL HEALTH | Age: 38
End: 2023-06-01

## 2023-06-06 ENCOUNTER — BEHAVIORAL HEALTH (OUTPATIENT)
Dept: BEHAVIORAL HEALTH | Age: 38
End: 2023-06-06

## 2023-06-06 DIAGNOSIS — F33.0 MAJOR DEPRESSIVE DISORDER, RECURRENT EPISODE, MILD (CMD): ICD-10-CM

## 2023-06-06 DIAGNOSIS — F41.1 GENERALIZED ANXIETY DISORDER: ICD-10-CM

## 2023-06-06 DIAGNOSIS — F43.10 PTSD (POST-TRAUMATIC STRESS DISORDER): Primary | ICD-10-CM

## 2023-06-06 PROCEDURE — 90853 GROUP PSYCHOTHERAPY: CPT | Performed by: PSYCHOLOGIST

## 2023-06-06 PROCEDURE — 90837 PSYTX W PT 60 MINUTES: CPT | Performed by: PSYCHOLOGIST

## 2023-06-07 ENCOUNTER — APPOINTMENT (OUTPATIENT)
Dept: LAB | Age: 38
End: 2023-06-07

## 2023-06-07 RX ORDER — LISDEXAMFETAMINE DIMESYLATE CAPSULES 30 MG/1
30 CAPSULE ORAL EVERY MORNING
Qty: 30 CAPSULE | Refills: 0 | Status: SHIPPED | OUTPATIENT
Start: 2023-06-07 | End: 2023-06-13 | Stop reason: DRUGHIGH

## 2023-06-08 ENCOUNTER — BEHAVIORAL HEALTH (OUTPATIENT)
Dept: BEHAVIORAL HEALTH | Age: 38
End: 2023-06-08

## 2023-06-08 DIAGNOSIS — F33.0 MAJOR DEPRESSIVE DISORDER, RECURRENT EPISODE, MILD (CMD): ICD-10-CM

## 2023-06-08 DIAGNOSIS — F41.1 GENERALIZED ANXIETY DISORDER: ICD-10-CM

## 2023-06-08 DIAGNOSIS — F43.10 PTSD (POST-TRAUMATIC STRESS DISORDER): Primary | ICD-10-CM

## 2023-06-08 PROCEDURE — 90853 GROUP PSYCHOTHERAPY: CPT | Performed by: PSYCHOLOGIST

## 2023-06-13 ENCOUNTER — APPOINTMENT (OUTPATIENT)
Dept: BEHAVIORAL HEALTH | Age: 38
End: 2023-06-13

## 2023-06-13 ENCOUNTER — V-VISIT (OUTPATIENT)
Dept: BEHAVIORAL HEALTH | Age: 38
End: 2023-06-13

## 2023-06-13 DIAGNOSIS — F33.0 MAJOR DEPRESSIVE DISORDER, RECURRENT EPISODE, MILD (CMD): ICD-10-CM

## 2023-06-13 DIAGNOSIS — F43.10 PTSD (POST-TRAUMATIC STRESS DISORDER): ICD-10-CM

## 2023-06-13 DIAGNOSIS — F90.0 ATTENTION DEFICIT HYPERACTIVITY DISORDER (ADHD), PREDOMINANTLY INATTENTIVE TYPE: Primary | ICD-10-CM

## 2023-06-13 PROCEDURE — 99215 OFFICE O/P EST HI 40 MIN: CPT | Performed by: REGISTERED NURSE

## 2023-06-13 RX ORDER — LISDEXAMFETAMINE DIMESYLATE CAPSULES 40 MG/1
40 CAPSULE ORAL EVERY MORNING
Qty: 30 CAPSULE | Refills: 0 | Status: SHIPPED | OUTPATIENT
Start: 2023-06-13 | End: 2023-07-06 | Stop reason: DRUGHIGH

## 2023-06-13 RX ORDER — CLONIDINE HYDROCHLORIDE 0.1 MG/1
0.1 TABLET ORAL 2 TIMES DAILY
Qty: 90 TABLET | Refills: 2 | Status: SHIPPED | OUTPATIENT
Start: 2023-06-13 | End: 2023-08-24 | Stop reason: SDUPTHER

## 2023-06-15 ENCOUNTER — BEHAVIORAL HEALTH (OUTPATIENT)
Dept: BEHAVIORAL HEALTH | Age: 38
End: 2023-06-15

## 2023-06-15 DIAGNOSIS — F43.10 PTSD (POST-TRAUMATIC STRESS DISORDER): Primary | ICD-10-CM

## 2023-06-15 PROCEDURE — 90853 GROUP PSYCHOTHERAPY: CPT | Performed by: SOCIAL WORKER

## 2023-06-19 ENCOUNTER — OFF PREMISE (OUTPATIENT)
Dept: OCCUPATIONAL MEDICINE | Age: 38
End: 2023-06-19

## 2023-06-19 DIAGNOSIS — Z02.89 ENCOUNTER FOR OCCUPATIONAL HEALTH EXAMINATION: Primary | ICD-10-CM

## 2023-06-19 DIAGNOSIS — Z02.89 VISIT FOR OCCUPATIONAL HEALTH EXAMINATION: ICD-10-CM

## 2023-06-19 PROCEDURE — OH063 AUDIOMETRIC TESTING INTERP: Performed by: PHYSICIAN ASSISTANT

## 2023-06-19 PROCEDURE — 92552 PURE TONE AUDIOMETRY AIR: CPT | Performed by: PHYSICIAN ASSISTANT

## 2023-06-20 ENCOUNTER — BEHAVIORAL HEALTH (OUTPATIENT)
Dept: BEHAVIORAL HEALTH | Age: 38
End: 2023-06-20

## 2023-06-20 DIAGNOSIS — F33.0 MAJOR DEPRESSIVE DISORDER, RECURRENT EPISODE, MILD (CMD): ICD-10-CM

## 2023-06-20 DIAGNOSIS — F41.1 GENERALIZED ANXIETY DISORDER: ICD-10-CM

## 2023-06-20 DIAGNOSIS — F43.10 PTSD (POST-TRAUMATIC STRESS DISORDER): Primary | ICD-10-CM

## 2023-06-20 PROCEDURE — 90837 PSYTX W PT 60 MINUTES: CPT | Performed by: PSYCHOLOGIST

## 2023-06-20 PROCEDURE — 90853 GROUP PSYCHOTHERAPY: CPT | Performed by: PSYCHOLOGIST

## 2023-07-06 ENCOUNTER — V-VISIT (OUTPATIENT)
Dept: BEHAVIORAL HEALTH | Age: 38
End: 2023-07-06

## 2023-07-06 DIAGNOSIS — F43.10 PTSD (POST-TRAUMATIC STRESS DISORDER): ICD-10-CM

## 2023-07-06 DIAGNOSIS — F90.0 ATTENTION DEFICIT HYPERACTIVITY DISORDER (ADHD), PREDOMINANTLY INATTENTIVE TYPE: Primary | ICD-10-CM

## 2023-07-06 DIAGNOSIS — F33.0 MAJOR DEPRESSIVE DISORDER, RECURRENT EPISODE, MILD (CMD): ICD-10-CM

## 2023-07-06 PROCEDURE — 99215 OFFICE O/P EST HI 40 MIN: CPT | Performed by: REGISTERED NURSE

## 2023-07-06 RX ORDER — LISDEXAMFETAMINE DIMESYLATE CAPSULES 50 MG/1
50 CAPSULE ORAL EVERY MORNING
Qty: 14 CAPSULE | Refills: 0 | Status: SHIPPED | OUTPATIENT
Start: 2023-07-13 | End: 2023-07-28 | Stop reason: SDUPTHER

## 2023-07-11 ENCOUNTER — BEHAVIORAL HEALTH (OUTPATIENT)
Dept: BEHAVIORAL HEALTH | Age: 38
End: 2023-07-11

## 2023-07-11 DIAGNOSIS — F43.10 PTSD (POST-TRAUMATIC STRESS DISORDER): Primary | ICD-10-CM

## 2023-07-11 DIAGNOSIS — F33.0 MAJOR DEPRESSIVE DISORDER, RECURRENT EPISODE, MILD (CMD): ICD-10-CM

## 2023-07-11 DIAGNOSIS — F41.1 GENERALIZED ANXIETY DISORDER: ICD-10-CM

## 2023-07-11 PROCEDURE — 90837 PSYTX W PT 60 MINUTES: CPT | Performed by: PSYCHOLOGIST

## 2023-07-13 ENCOUNTER — BEHAVIORAL HEALTH (OUTPATIENT)
Dept: BEHAVIORAL HEALTH | Age: 38
End: 2023-07-13

## 2023-07-13 DIAGNOSIS — F43.10 PTSD (POST-TRAUMATIC STRESS DISORDER): Primary | ICD-10-CM

## 2023-07-13 PROCEDURE — 90853 GROUP PSYCHOTHERAPY: CPT | Performed by: SOCIAL WORKER

## 2023-07-28 ENCOUNTER — E-ADVICE (OUTPATIENT)
Dept: BEHAVIORAL HEALTH | Age: 38
End: 2023-07-28

## 2023-07-31 ENCOUNTER — BEHAVIORAL HEALTH (OUTPATIENT)
Dept: BEHAVIORAL HEALTH | Age: 38
End: 2023-07-31

## 2023-07-31 DIAGNOSIS — F41.1 GENERALIZED ANXIETY DISORDER: ICD-10-CM

## 2023-07-31 DIAGNOSIS — F33.0 MAJOR DEPRESSIVE DISORDER, RECURRENT EPISODE, MILD (CMD): ICD-10-CM

## 2023-07-31 DIAGNOSIS — F43.10 PTSD (POST-TRAUMATIC STRESS DISORDER): Primary | ICD-10-CM

## 2023-07-31 PROCEDURE — 90837 PSYTX W PT 60 MINUTES: CPT | Performed by: PSYCHOLOGIST

## 2023-07-31 RX ORDER — LISDEXAMFETAMINE DIMESYLATE CAPSULES 50 MG/1
50 CAPSULE ORAL EVERY MORNING
Qty: 30 CAPSULE | Refills: 0 | Status: SHIPPED | OUTPATIENT
Start: 2023-07-31 | End: 2023-08-24 | Stop reason: DRUGHIGH

## 2023-08-02 ENCOUNTER — E-ADVICE (OUTPATIENT)
Dept: BEHAVIORAL HEALTH | Age: 38
End: 2023-08-02

## 2023-08-08 ENCOUNTER — BEHAVIORAL HEALTH (OUTPATIENT)
Dept: BEHAVIORAL HEALTH | Age: 38
End: 2023-08-08

## 2023-08-08 DIAGNOSIS — F43.10 PTSD (POST-TRAUMATIC STRESS DISORDER): Primary | ICD-10-CM

## 2023-08-08 PROCEDURE — 90853 GROUP PSYCHOTHERAPY: CPT | Performed by: PSYCHOLOGIST

## 2023-08-09 ENCOUNTER — BEHAVIORAL HEALTH (OUTPATIENT)
Dept: BEHAVIORAL HEALTH | Age: 38
End: 2023-08-09

## 2023-08-09 DIAGNOSIS — F41.1 GENERALIZED ANXIETY DISORDER: ICD-10-CM

## 2023-08-09 DIAGNOSIS — Z63.4 BEREAVEMENT: ICD-10-CM

## 2023-08-09 DIAGNOSIS — F33.0 MAJOR DEPRESSIVE DISORDER, RECURRENT EPISODE, MILD (CMD): ICD-10-CM

## 2023-08-09 DIAGNOSIS — F43.10 PTSD (POST-TRAUMATIC STRESS DISORDER): Primary | ICD-10-CM

## 2023-08-09 PROCEDURE — 90837 PSYTX W PT 60 MINUTES: CPT | Performed by: PSYCHOLOGIST

## 2023-08-09 SDOH — SOCIAL STABILITY - SOCIAL INSECURITY: DISSAPEARANCE AND DEATH OF FAMILY MEMBER: Z63.4

## 2023-08-10 ENCOUNTER — BEHAVIORAL HEALTH (OUTPATIENT)
Dept: BEHAVIORAL HEALTH | Age: 38
End: 2023-08-10

## 2023-08-10 DIAGNOSIS — F43.10 PTSD (POST-TRAUMATIC STRESS DISORDER): Primary | ICD-10-CM

## 2023-08-10 PROCEDURE — 90853 GROUP PSYCHOTHERAPY: CPT | Performed by: PSYCHOLOGIST

## 2023-08-15 ENCOUNTER — BEHAVIORAL HEALTH (OUTPATIENT)
Dept: BEHAVIORAL HEALTH | Age: 38
End: 2023-08-15

## 2023-08-15 DIAGNOSIS — Z63.4 BEREAVEMENT: ICD-10-CM

## 2023-08-15 DIAGNOSIS — F41.1 GENERALIZED ANXIETY DISORDER: ICD-10-CM

## 2023-08-15 DIAGNOSIS — F43.10 PTSD (POST-TRAUMATIC STRESS DISORDER): Primary | ICD-10-CM

## 2023-08-15 DIAGNOSIS — F33.0 MAJOR DEPRESSIVE DISORDER, RECURRENT EPISODE, MILD (CMD): ICD-10-CM

## 2023-08-15 PROCEDURE — 90837 PSYTX W PT 60 MINUTES: CPT | Performed by: PSYCHOLOGIST

## 2023-08-15 SDOH — SOCIAL STABILITY - SOCIAL INSECURITY: DISSAPEARANCE AND DEATH OF FAMILY MEMBER: Z63.4

## 2023-08-24 ENCOUNTER — E-ADVICE (OUTPATIENT)
Dept: BEHAVIORAL HEALTH | Age: 38
End: 2023-08-24

## 2023-08-24 ENCOUNTER — BEHAVIORAL HEALTH (OUTPATIENT)
Dept: BEHAVIORAL HEALTH | Age: 38
End: 2023-08-24

## 2023-08-24 DIAGNOSIS — F41.1 GENERALIZED ANXIETY DISORDER: ICD-10-CM

## 2023-08-24 DIAGNOSIS — F43.10 PTSD (POST-TRAUMATIC STRESS DISORDER): Primary | ICD-10-CM

## 2023-08-24 DIAGNOSIS — F33.0 MAJOR DEPRESSIVE DISORDER, RECURRENT EPISODE, MILD (CMD): ICD-10-CM

## 2023-08-24 DIAGNOSIS — F43.10 PTSD (POST-TRAUMATIC STRESS DISORDER): ICD-10-CM

## 2023-08-24 DIAGNOSIS — F90.0 ATTENTION DEFICIT HYPERACTIVITY DISORDER (ADHD), PREDOMINANTLY INATTENTIVE TYPE: Primary | ICD-10-CM

## 2023-08-24 PROCEDURE — 99215 OFFICE O/P EST HI 40 MIN: CPT | Performed by: REGISTERED NURSE

## 2023-08-24 PROCEDURE — 90853 GROUP PSYCHOTHERAPY: CPT | Performed by: SOCIAL WORKER

## 2023-08-24 RX ORDER — CLONIDINE HYDROCHLORIDE 0.1 MG/1
0.1 TABLET ORAL 2 TIMES DAILY
Qty: 90 TABLET | Refills: 2 | Status: SHIPPED | OUTPATIENT
Start: 2023-08-24 | End: 2023-10-26 | Stop reason: SDUPTHER

## 2023-08-24 RX ORDER — LISDEXAMFETAMINE DIMESYLATE CAPSULES 40 MG/1
40 CAPSULE ORAL EVERY MORNING
Qty: 30 CAPSULE | Refills: 0 | Status: SHIPPED | OUTPATIENT
Start: 2023-08-30 | End: 2023-08-30 | Stop reason: SDUPTHER

## 2023-08-29 ENCOUNTER — APPOINTMENT (OUTPATIENT)
Dept: BEHAVIORAL HEALTH | Age: 38
End: 2023-08-29

## 2023-08-29 ENCOUNTER — BEHAVIORAL HEALTH (OUTPATIENT)
Dept: BEHAVIORAL HEALTH | Age: 38
End: 2023-08-29

## 2023-08-29 ENCOUNTER — E-ADVICE (OUTPATIENT)
Dept: BEHAVIORAL HEALTH | Age: 38
End: 2023-08-29

## 2023-08-29 DIAGNOSIS — F41.1 GENERALIZED ANXIETY DISORDER: ICD-10-CM

## 2023-08-29 DIAGNOSIS — F43.10 PTSD (POST-TRAUMATIC STRESS DISORDER): Primary | ICD-10-CM

## 2023-08-29 DIAGNOSIS — F33.0 MAJOR DEPRESSIVE DISORDER, RECURRENT EPISODE, MILD (CMD): ICD-10-CM

## 2023-08-29 DIAGNOSIS — Z63.4 BEREAVEMENT: ICD-10-CM

## 2023-08-29 PROCEDURE — 90837 PSYTX W PT 60 MINUTES: CPT | Performed by: PSYCHOLOGIST

## 2023-08-29 SDOH — SOCIAL STABILITY - SOCIAL INSECURITY: DISSAPEARANCE AND DEATH OF FAMILY MEMBER: Z63.4

## 2023-08-30 RX ORDER — LISDEXAMFETAMINE DIMESYLATE CAPSULES 20 MG/1
40 CAPSULE ORAL EVERY MORNING
Qty: 60 CAPSULE | Refills: 0 | Status: SHIPPED | OUTPATIENT
Start: 2023-08-30 | End: 2023-09-18 | Stop reason: SDUPTHER

## 2023-09-12 ENCOUNTER — BEHAVIORAL HEALTH (OUTPATIENT)
Dept: BEHAVIORAL HEALTH | Age: 38
End: 2023-09-12

## 2023-09-12 DIAGNOSIS — F43.10 PTSD (POST-TRAUMATIC STRESS DISORDER): Primary | ICD-10-CM

## 2023-09-12 DIAGNOSIS — F41.1 GENERALIZED ANXIETY DISORDER: ICD-10-CM

## 2023-09-12 DIAGNOSIS — F33.0 MAJOR DEPRESSIVE DISORDER, RECURRENT EPISODE, MILD (CMD): ICD-10-CM

## 2023-09-12 DIAGNOSIS — Z63.4 BEREAVEMENT: ICD-10-CM

## 2023-09-12 PROCEDURE — 90837 PSYTX W PT 60 MINUTES: CPT | Performed by: PSYCHOLOGIST

## 2023-09-12 PROCEDURE — 90853 GROUP PSYCHOTHERAPY: CPT | Performed by: PSYCHOLOGIST

## 2023-09-12 SDOH — SOCIAL STABILITY - SOCIAL INSECURITY: DISSAPEARANCE AND DEATH OF FAMILY MEMBER: Z63.4

## 2023-09-13 ENCOUNTER — NURSE TRIAGE (OUTPATIENT)
Dept: TELEHEALTH | Age: 38
End: 2023-09-13

## 2023-09-13 ENCOUNTER — V-VISIT (OUTPATIENT)
Dept: FAMILY MEDICINE | Age: 38
End: 2023-09-13

## 2023-09-13 DIAGNOSIS — R69 DIAGNOSIS DEFERRED: Primary | ICD-10-CM

## 2023-09-14 ENCOUNTER — WALK IN (OUTPATIENT)
Dept: URGENT CARE | Age: 38
End: 2023-09-14

## 2023-09-14 VITALS
RESPIRATION RATE: 18 BRPM | HEIGHT: 69 IN | WEIGHT: 185.85 LBS | HEART RATE: 78 BPM | TEMPERATURE: 97.9 F | DIASTOLIC BLOOD PRESSURE: 71 MMHG | SYSTOLIC BLOOD PRESSURE: 111 MMHG | BODY MASS INDEX: 27.53 KG/M2 | OXYGEN SATURATION: 97 %

## 2023-09-14 DIAGNOSIS — L03.115 CELLULITIS OF RIGHT LOWER EXTREMITY: Primary | ICD-10-CM

## 2023-09-14 PROCEDURE — 99213 OFFICE O/P EST LOW 20 MIN: CPT | Performed by: NURSE PRACTITIONER

## 2023-09-14 RX ORDER — CEPHALEXIN 500 MG/1
500 CAPSULE ORAL 4 TIMES DAILY
Qty: 20 CAPSULE | Refills: 0 | Status: SHIPPED | OUTPATIENT
Start: 2023-09-14 | End: 2023-09-19

## 2023-09-19 ENCOUNTER — APPOINTMENT (OUTPATIENT)
Dept: BEHAVIORAL HEALTH | Age: 38
End: 2023-09-19

## 2023-09-21 ENCOUNTER — LAB SERVICES (OUTPATIENT)
Dept: LAB | Age: 38
End: 2023-09-21

## 2023-09-21 DIAGNOSIS — Z30.09 STERILIZATION CONSULT: ICD-10-CM

## 2023-09-21 LAB — SPERM P VAS #/AREA SMN HPF: NORMAL /HPF

## 2023-09-21 PROCEDURE — 89321 SEMEN ANAL SPERM DETECTION: CPT | Performed by: INTERNAL MEDICINE

## 2023-09-26 ENCOUNTER — BEHAVIORAL HEALTH (OUTPATIENT)
Dept: BEHAVIORAL HEALTH | Age: 38
End: 2023-09-26

## 2023-09-26 DIAGNOSIS — F43.10 PTSD (POST-TRAUMATIC STRESS DISORDER): Primary | ICD-10-CM

## 2023-09-26 DIAGNOSIS — Z63.4 BEREAVEMENT: ICD-10-CM

## 2023-09-26 DIAGNOSIS — F33.0 MAJOR DEPRESSIVE DISORDER, RECURRENT EPISODE, MILD (CMD): ICD-10-CM

## 2023-09-26 DIAGNOSIS — F41.1 GENERALIZED ANXIETY DISORDER: ICD-10-CM

## 2023-09-26 PROCEDURE — 90834 PSYTX W PT 45 MINUTES: CPT | Performed by: PSYCHOLOGIST

## 2023-09-26 SDOH — SOCIAL STABILITY - SOCIAL INSECURITY: DISSAPEARANCE AND DEATH OF FAMILY MEMBER: Z63.4

## 2023-10-03 ENCOUNTER — APPOINTMENT (OUTPATIENT)
Dept: BEHAVIORAL HEALTH | Age: 38
End: 2023-10-03

## 2023-10-06 RX ORDER — LISDEXAMFETAMINE DIMESYLATE CAPSULES 20 MG/1
40 CAPSULE ORAL EVERY MORNING
Qty: 60 CAPSULE | Refills: 0 | Status: CANCELLED | OUTPATIENT
Start: 2023-10-06

## 2023-10-10 ENCOUNTER — TELEPHONE (OUTPATIENT)
Dept: BEHAVIORAL HEALTH | Age: 38
End: 2023-10-10

## 2023-10-10 ENCOUNTER — BEHAVIORAL HEALTH (OUTPATIENT)
Dept: BEHAVIORAL HEALTH | Age: 38
End: 2023-10-10

## 2023-10-10 DIAGNOSIS — F43.10 PTSD (POST-TRAUMATIC STRESS DISORDER): Primary | ICD-10-CM

## 2023-10-10 PROCEDURE — 90853 GROUP PSYCHOTHERAPY: CPT | Performed by: SOCIAL WORKER

## 2023-10-10 RX ORDER — LISDEXAMFETAMINE DIMESYLATE CAPSULES 20 MG/1
40 CAPSULE ORAL EVERY MORNING
Qty: 60 CAPSULE | Refills: 0 | Status: CANCELLED | OUTPATIENT
Start: 2023-10-10

## 2023-10-11 ENCOUNTER — BEHAVIORAL HEALTH (OUTPATIENT)
Dept: BEHAVIORAL HEALTH | Age: 38
End: 2023-10-11

## 2023-10-11 DIAGNOSIS — F43.10 PTSD (POST-TRAUMATIC STRESS DISORDER): Primary | ICD-10-CM

## 2023-10-11 DIAGNOSIS — Z63.4 BEREAVEMENT: ICD-10-CM

## 2023-10-11 DIAGNOSIS — F33.0 MAJOR DEPRESSIVE DISORDER, RECURRENT EPISODE, MILD (CMD): ICD-10-CM

## 2023-10-11 DIAGNOSIS — F41.1 GENERALIZED ANXIETY DISORDER: ICD-10-CM

## 2023-10-11 PROCEDURE — 90837 PSYTX W PT 60 MINUTES: CPT | Performed by: PSYCHOLOGIST

## 2023-10-11 RX ORDER — LISDEXAMFETAMINE DIMESYLATE CAPSULES 40 MG/1
40 CAPSULE ORAL EVERY MORNING
Qty: 30 CAPSULE | Refills: 0 | Status: SHIPPED | OUTPATIENT
Start: 2023-10-11 | End: 2023-10-26 | Stop reason: SDUPTHER

## 2023-10-11 SDOH — SOCIAL STABILITY - SOCIAL INSECURITY: DISSAPEARANCE AND DEATH OF FAMILY MEMBER: Z63.4

## 2023-10-17 ENCOUNTER — APPOINTMENT (OUTPATIENT)
Dept: BEHAVIORAL HEALTH | Age: 38
End: 2023-10-17

## 2023-10-26 ENCOUNTER — BEHAVIORAL HEALTH (OUTPATIENT)
Dept: BEHAVIORAL HEALTH | Age: 38
End: 2023-10-26

## 2023-10-26 DIAGNOSIS — F90.0 ATTENTION DEFICIT HYPERACTIVITY DISORDER (ADHD), PREDOMINANTLY INATTENTIVE TYPE: Primary | ICD-10-CM

## 2023-10-26 DIAGNOSIS — F43.10 PTSD (POST-TRAUMATIC STRESS DISORDER): ICD-10-CM

## 2023-10-26 DIAGNOSIS — F33.0 MAJOR DEPRESSIVE DISORDER, RECURRENT EPISODE, MILD (CMD): ICD-10-CM

## 2023-10-26 PROCEDURE — 99215 OFFICE O/P EST HI 40 MIN: CPT | Performed by: REGISTERED NURSE

## 2023-10-26 RX ORDER — LISDEXAMFETAMINE DIMESYLATE CAPSULES 40 MG/1
40 CAPSULE ORAL EVERY MORNING
Qty: 30 CAPSULE | Refills: 0 | Status: SHIPPED | OUTPATIENT
Start: 2023-11-10 | End: 2023-11-06 | Stop reason: SDUPTHER

## 2023-10-26 RX ORDER — CLONIDINE HYDROCHLORIDE 0.1 MG/1
0.1 TABLET ORAL 2 TIMES DAILY
Qty: 90 TABLET | Refills: 2 | Status: SHIPPED | OUTPATIENT
Start: 2023-10-26

## 2023-11-01 ENCOUNTER — BEHAVIORAL HEALTH (OUTPATIENT)
Dept: BEHAVIORAL HEALTH | Age: 38
End: 2023-11-01

## 2023-11-01 DIAGNOSIS — F41.1 GENERALIZED ANXIETY DISORDER: ICD-10-CM

## 2023-11-01 DIAGNOSIS — F33.0 MAJOR DEPRESSIVE DISORDER, RECURRENT EPISODE, MILD (CMD): ICD-10-CM

## 2023-11-01 DIAGNOSIS — F43.10 PTSD (POST-TRAUMATIC STRESS DISORDER): Primary | ICD-10-CM

## 2023-11-01 PROCEDURE — 90837 PSYTX W PT 60 MINUTES: CPT | Performed by: PSYCHOLOGIST

## 2023-11-05 ENCOUNTER — E-ADVICE (OUTPATIENT)
Dept: BEHAVIORAL HEALTH | Age: 38
End: 2023-11-05

## 2023-11-06 RX ORDER — LISDEXAMFETAMINE DIMESYLATE CAPSULES 40 MG/1
40 CAPSULE ORAL EVERY MORNING
Qty: 30 CAPSULE | Refills: 0 | Status: SHIPPED | OUTPATIENT
Start: 2023-11-07

## 2023-11-07 ENCOUNTER — APPOINTMENT (OUTPATIENT)
Dept: BEHAVIORAL HEALTH | Age: 38
End: 2023-11-07

## 2023-11-14 ENCOUNTER — APPOINTMENT (OUTPATIENT)
Dept: BEHAVIORAL HEALTH | Age: 38
End: 2023-11-14

## 2023-11-14 ENCOUNTER — OFFICE VISIT (OUTPATIENT)
Dept: FAMILY MEDICINE | Age: 38
End: 2023-11-14

## 2023-11-14 ENCOUNTER — LAB SERVICES (OUTPATIENT)
Dept: LAB | Age: 38
End: 2023-11-14

## 2023-11-14 VITALS
DIASTOLIC BLOOD PRESSURE: 72 MMHG | RESPIRATION RATE: 16 BRPM | BODY MASS INDEX: 27.25 KG/M2 | HEART RATE: 82 BPM | WEIGHT: 179.78 LBS | OXYGEN SATURATION: 99 % | TEMPERATURE: 97.8 F | HEIGHT: 68 IN | SYSTOLIC BLOOD PRESSURE: 103 MMHG

## 2023-11-14 DIAGNOSIS — R41.89 BRAIN FOG: ICD-10-CM

## 2023-11-14 DIAGNOSIS — Z00.00 ANNUAL PHYSICAL EXAM: ICD-10-CM

## 2023-11-14 DIAGNOSIS — Z00.00 ANNUAL PHYSICAL EXAM: Primary | ICD-10-CM

## 2023-11-14 DIAGNOSIS — R53.83 OTHER FATIGUE: ICD-10-CM

## 2023-11-14 DIAGNOSIS — R68.82 LIBIDO, DECREASED: ICD-10-CM

## 2023-11-14 DIAGNOSIS — E55.9 VITAMIN D DEFICIENCY: ICD-10-CM

## 2023-11-14 PROCEDURE — 84630 ASSAY OF ZINC: CPT | Performed by: CLINICAL MEDICAL LABORATORY

## 2023-11-14 PROCEDURE — 80061 LIPID PANEL: CPT | Performed by: CLINICAL MEDICAL LABORATORY

## 2023-11-14 PROCEDURE — 99395 PREV VISIT EST AGE 18-39: CPT | Performed by: FAMILY MEDICINE

## 2023-11-14 PROCEDURE — 36415 COLL VENOUS BLD VENIPUNCTURE: CPT | Performed by: INTERNAL MEDICINE

## 2023-11-14 PROCEDURE — 83036 HEMOGLOBIN GLYCOSYLATED A1C: CPT | Performed by: CLINICAL MEDICAL LABORATORY

## 2023-11-14 PROCEDURE — 83735 ASSAY OF MAGNESIUM: CPT | Performed by: CLINICAL MEDICAL LABORATORY

## 2023-11-14 PROCEDURE — 85025 COMPLETE CBC W/AUTO DIFF WBC: CPT | Performed by: CLINICAL MEDICAL LABORATORY

## 2023-11-14 PROCEDURE — 83540 ASSAY OF IRON: CPT | Performed by: CLINICAL MEDICAL LABORATORY

## 2023-11-14 PROCEDURE — 80053 COMPREHEN METABOLIC PANEL: CPT | Performed by: CLINICAL MEDICAL LABORATORY

## 2023-11-14 PROCEDURE — 82746 ASSAY OF FOLIC ACID SERUM: CPT | Performed by: CLINICAL MEDICAL LABORATORY

## 2023-11-14 PROCEDURE — 82306 VITAMIN D 25 HYDROXY: CPT | Performed by: CLINICAL MEDICAL LABORATORY

## 2023-11-14 PROCEDURE — 82607 VITAMIN B-12: CPT | Performed by: CLINICAL MEDICAL LABORATORY

## 2023-11-14 PROCEDURE — 83550 IRON BINDING TEST: CPT | Performed by: CLINICAL MEDICAL LABORATORY

## 2023-11-14 ASSESSMENT — PATIENT HEALTH QUESTIONNAIRE - PHQ9
2. FEELING DOWN, DEPRESSED OR HOPELESS: NOT AT ALL
SUM OF ALL RESPONSES TO PHQ9 QUESTIONS 1 AND 2: 0
1. LITTLE INTEREST OR PLEASURE IN DOING THINGS: NOT AT ALL
SUM OF ALL RESPONSES TO PHQ9 QUESTIONS 1 AND 2: 0
CLINICAL INTERPRETATION OF PHQ2 SCORE: NO FURTHER SCREENING NEEDED

## 2023-11-14 ASSESSMENT — PAIN SCALES - GENERAL: PAINLEVEL: 0

## 2023-11-15 ENCOUNTER — TELEPHONE (OUTPATIENT)
Dept: FAMILY MEDICINE | Age: 38
End: 2023-11-15

## 2023-11-15 LAB
25(OH)D3+25(OH)D2 SERPL-MCNC: 36.4 NG/ML (ref 30–100)
ALBUMIN SERPL-MCNC: 4.4 G/DL (ref 3.6–5.1)
ALBUMIN/GLOB SERPL: 1.3 {RATIO} (ref 1–2.4)
ALP SERPL-CCNC: 52 UNITS/L (ref 45–117)
ALT SERPL-CCNC: 50 UNITS/L
ANION GAP SERPL CALC-SCNC: 8 MMOL/L (ref 7–19)
AST SERPL-CCNC: 31 UNITS/L
BASOPHILS # BLD: 0.1 K/MCL (ref 0–0.3)
BASOPHILS NFR BLD: 1 %
BILIRUB SERPL-MCNC: 0.9 MG/DL (ref 0.2–1)
BUN SERPL-MCNC: 19 MG/DL (ref 6–20)
BUN/CREAT SERPL: 14 (ref 7–25)
CALCIUM SERPL-MCNC: 9.2 MG/DL (ref 8.4–10.2)
CHLORIDE SERPL-SCNC: 106 MMOL/L (ref 97–110)
CHOLEST SERPL-MCNC: 193 MG/DL
CHOLEST/HDLC SERPL: 3.4 {RATIO}
CO2 SERPL-SCNC: 27 MMOL/L (ref 21–32)
CREAT SERPL-MCNC: 1.33 MG/DL (ref 0.67–1.17)
DEPRECATED RDW RBC: 39.7 FL (ref 39–50)
EGFRCR SERPLBLD CKD-EPI 2021: 70 ML/MIN/{1.73_M2}
EOSINOPHIL # BLD: 0.1 K/MCL (ref 0–0.5)
EOSINOPHIL NFR BLD: 2 %
ERYTHROCYTE [DISTWIDTH] IN BLOOD: 12.9 % (ref 11–15)
FASTING DURATION TIME PATIENT: 19 HOURS (ref 0–999)
FOLATE SERPL-MCNC: >24 NG/ML
GLOBULIN SER-MCNC: 3.5 G/DL (ref 2–4)
GLUCOSE SERPL-MCNC: 77 MG/DL (ref 70–99)
HBA1C MFR BLD: 5.2 % (ref 4.5–5.6)
HCT VFR BLD CALC: 44.8 % (ref 39–51)
HDLC SERPL-MCNC: 57 MG/DL
HGB BLD-MCNC: 15.5 G/DL (ref 13–17)
IMM GRANULOCYTES # BLD AUTO: 0 K/MCL (ref 0–0.2)
IMM GRANULOCYTES # BLD: 0 %
IRON SATN MFR SERPL: 35 % (ref 15–45)
IRON SERPL-MCNC: 110 MCG/DL (ref 65–175)
LDLC SERPL CALC-MCNC: 120 MG/DL
LYMPHOCYTES # BLD: 1.9 K/MCL (ref 1–4.8)
LYMPHOCYTES NFR BLD: 32 %
MAGNESIUM SERPL-MCNC: 2.4 MG/DL (ref 1.7–2.4)
MCH RBC QN AUTO: 29.4 PG (ref 26–34)
MCHC RBC AUTO-ENTMCNC: 34.6 G/DL (ref 32–36.5)
MCV RBC AUTO: 84.8 FL (ref 78–100)
MONOCYTES # BLD: 0.5 K/MCL (ref 0.3–0.9)
MONOCYTES NFR BLD: 9 %
NEUTROPHILS # BLD: 3.2 K/MCL (ref 1.8–7.7)
NEUTROPHILS NFR BLD: 56 %
NONHDLC SERPL-MCNC: 136 MG/DL
NRBC BLD MANUAL-RTO: 0 /100 WBC
PLATELET # BLD AUTO: 229 K/MCL (ref 140–450)
POTASSIUM SERPL-SCNC: 4.1 MMOL/L (ref 3.4–5.1)
PROT SERPL-MCNC: 7.9 G/DL (ref 6.4–8.2)
RBC # BLD: 5.28 MIL/MCL (ref 4.5–5.9)
SODIUM SERPL-SCNC: 137 MMOL/L (ref 135–145)
TIBC SERPL-MCNC: 315 MCG/DL (ref 250–450)
TRIGL SERPL-MCNC: 82 MG/DL
VIT B12 SERPL-MCNC: 451 PG/ML (ref 211–911)
WBC # BLD: 5.8 K/MCL (ref 4.2–11)
ZINC SERPL-MCNC: 98 MCG/DL (ref 70–120)

## 2023-11-17 ENCOUNTER — LAB SERVICES (OUTPATIENT)
Dept: LAB | Age: 38
End: 2023-11-17

## 2023-11-17 DIAGNOSIS — R68.82 LIBIDO, DECREASED: ICD-10-CM

## 2023-11-17 LAB
SHBG SERPL-SCNC: 20 NMOL/L (ref 11–80)
TESTOST FREE MFR SERPL: 2.4 % (ref 1.6–2.9)
TESTOST FREE SERPL-MCNC: 98 PG/ML (ref 47–244)
TESTOST SERPL-MCNC: 409.5 NG/DL (ref 280–1100)
TESTOSTERONE.FREE+WB SERPL-MCNC: 264 NG/DL (ref 131–682)

## 2023-11-17 PROCEDURE — 84270 ASSAY OF SEX HORMONE GLOBUL: CPT | Performed by: CLINICAL MEDICAL LABORATORY

## 2023-11-17 PROCEDURE — 36415 COLL VENOUS BLD VENIPUNCTURE: CPT | Performed by: INTERNAL MEDICINE

## 2023-11-17 PROCEDURE — 84403 ASSAY OF TOTAL TESTOSTERONE: CPT | Performed by: CLINICAL MEDICAL LABORATORY

## 2023-11-21 ENCOUNTER — APPOINTMENT (OUTPATIENT)
Dept: BEHAVIORAL HEALTH | Age: 38
End: 2023-11-21

## 2023-11-22 ENCOUNTER — APPOINTMENT (OUTPATIENT)
Dept: BEHAVIORAL HEALTH | Age: 38
End: 2023-11-22

## 2023-11-22 DIAGNOSIS — F43.10 PTSD (POST-TRAUMATIC STRESS DISORDER): Primary | ICD-10-CM

## 2023-11-22 DIAGNOSIS — F41.1 GENERALIZED ANXIETY DISORDER: ICD-10-CM

## 2023-11-22 DIAGNOSIS — F33.0 MAJOR DEPRESSIVE DISORDER, RECURRENT EPISODE, MILD (CMD): ICD-10-CM

## 2023-11-22 PROCEDURE — 90837 PSYTX W PT 60 MINUTES: CPT | Performed by: PSYCHOLOGIST

## 2023-11-28 ENCOUNTER — APPOINTMENT (OUTPATIENT)
Dept: BEHAVIORAL HEALTH | Age: 38
End: 2023-11-28

## 2023-12-05 ENCOUNTER — APPOINTMENT (OUTPATIENT)
Dept: BEHAVIORAL HEALTH | Age: 38
End: 2023-12-05

## 2023-12-12 ENCOUNTER — APPOINTMENT (OUTPATIENT)
Dept: BEHAVIORAL HEALTH | Age: 38
End: 2023-12-12

## 2023-12-13 DIAGNOSIS — F90.0 ATTENTION DEFICIT HYPERACTIVITY DISORDER (ADHD), PREDOMINANTLY INATTENTIVE TYPE: Primary | ICD-10-CM

## 2023-12-15 RX ORDER — LISDEXAMFETAMINE DIMESYLATE CAPSULES 40 MG/1
40 CAPSULE ORAL EVERY MORNING
Qty: 30 CAPSULE | Refills: 0 | Status: SHIPPED | OUTPATIENT
Start: 2023-12-15 | End: 2024-02-05 | Stop reason: RX

## 2023-12-20 ENCOUNTER — BEHAVIORAL HEALTH (OUTPATIENT)
Dept: BEHAVIORAL HEALTH | Age: 38
End: 2023-12-20

## 2023-12-20 ENCOUNTER — APPOINTMENT (OUTPATIENT)
Dept: BEHAVIORAL HEALTH | Age: 38
End: 2023-12-20

## 2023-12-20 DIAGNOSIS — F41.1 GENERALIZED ANXIETY DISORDER: ICD-10-CM

## 2023-12-20 DIAGNOSIS — F43.10 PTSD (POST-TRAUMATIC STRESS DISORDER): Primary | ICD-10-CM

## 2023-12-20 DIAGNOSIS — Z63.4 BEREAVEMENT: ICD-10-CM

## 2023-12-20 DIAGNOSIS — F33.0 MAJOR DEPRESSIVE DISORDER, RECURRENT EPISODE, MILD (CMD): ICD-10-CM

## 2023-12-20 PROCEDURE — 90837 PSYTX W PT 60 MINUTES: CPT | Performed by: PSYCHOLOGIST

## 2023-12-20 SDOH — SOCIAL STABILITY - SOCIAL INSECURITY: DISSAPEARANCE AND DEATH OF FAMILY MEMBER: Z63.4

## 2023-12-31 ENCOUNTER — E-ADVICE (OUTPATIENT)
Dept: BEHAVIORAL HEALTH | Age: 38
End: 2023-12-31

## 2024-01-10 ENCOUNTER — APPOINTMENT (OUTPATIENT)
Dept: BEHAVIORAL HEALTH | Age: 39
End: 2024-01-10

## 2024-01-10 DIAGNOSIS — F33.0 MAJOR DEPRESSIVE DISORDER, RECURRENT EPISODE, MILD (CMD): ICD-10-CM

## 2024-01-10 DIAGNOSIS — F41.1 GENERALIZED ANXIETY DISORDER: ICD-10-CM

## 2024-01-10 DIAGNOSIS — F43.10 PTSD (POST-TRAUMATIC STRESS DISORDER): Primary | ICD-10-CM

## 2024-01-10 PROCEDURE — 90837 PSYTX W PT 60 MINUTES: CPT | Performed by: PSYCHOLOGIST

## 2024-01-25 ENCOUNTER — APPOINTMENT (OUTPATIENT)
Dept: BEHAVIORAL HEALTH | Age: 39
End: 2024-01-25

## 2024-01-30 ENCOUNTER — APPOINTMENT (OUTPATIENT)
Dept: BEHAVIORAL HEALTH | Age: 39
End: 2024-01-30

## 2024-01-30 DIAGNOSIS — F41.1 GENERALIZED ANXIETY DISORDER: ICD-10-CM

## 2024-01-30 DIAGNOSIS — F33.0 MAJOR DEPRESSIVE DISORDER, RECURRENT EPISODE, MILD (CMD): ICD-10-CM

## 2024-01-30 DIAGNOSIS — F43.10 PTSD (POST-TRAUMATIC STRESS DISORDER): Primary | ICD-10-CM

## 2024-02-05 ENCOUNTER — APPOINTMENT (OUTPATIENT)
Dept: BEHAVIORAL HEALTH | Age: 39
End: 2024-02-05

## 2024-02-05 ENCOUNTER — E-ADVICE (OUTPATIENT)
Dept: BEHAVIORAL HEALTH | Age: 39
End: 2024-02-05

## 2024-02-05 DIAGNOSIS — F41.1 GENERALIZED ANXIETY DISORDER: ICD-10-CM

## 2024-02-05 DIAGNOSIS — F33.42 RECURRENT MAJOR DEPRESSIVE DISORDER, IN FULL REMISSION (CMD): ICD-10-CM

## 2024-02-05 DIAGNOSIS — F43.10 PTSD (POST-TRAUMATIC STRESS DISORDER): ICD-10-CM

## 2024-02-05 DIAGNOSIS — F90.0 ATTENTION DEFICIT HYPERACTIVITY DISORDER (ADHD), PREDOMINANTLY INATTENTIVE TYPE: Primary | ICD-10-CM

## 2024-02-05 PROCEDURE — 99215 OFFICE O/P EST HI 40 MIN: CPT | Performed by: REGISTERED NURSE

## 2024-02-05 RX ORDER — DEXTROAMPHETAMINE SACCHARATE, AMPHETAMINE ASPARTATE, DEXTROAMPHETAMINE SULFATE AND AMPHETAMINE SULFATE 2.5; 2.5; 2.5; 2.5 MG/1; MG/1; MG/1; MG/1
10 TABLET ORAL DAILY
Qty: 30 TABLET | Refills: 0 | Status: SHIPPED | OUTPATIENT
Start: 2024-02-05 | End: 2024-03-06

## 2024-02-05 RX ORDER — DEXTROAMPHETAMINE SACCHARATE, AMPHETAMINE ASPARTATE MONOHYDRATE, DEXTROAMPHETAMINE SULFATE AND AMPHETAMINE SULFATE 5; 5; 5; 5 MG/1; MG/1; MG/1; MG/1
20 CAPSULE, EXTENDED RELEASE ORAL DAILY
Qty: 30 CAPSULE | Refills: 0 | Status: SHIPPED | OUTPATIENT
Start: 2024-02-05

## 2024-02-09 ENCOUNTER — EXTERNAL RECORD (OUTPATIENT)
Dept: HEALTH INFORMATION MANAGEMENT | Facility: OTHER | Age: 39
End: 2024-02-09

## 2024-02-12 ENCOUNTER — E-ADVICE (OUTPATIENT)
Dept: BEHAVIORAL HEALTH | Age: 39
End: 2024-02-12

## 2024-02-19 ENCOUNTER — E-ADVICE (OUTPATIENT)
Dept: BEHAVIORAL HEALTH | Age: 39
End: 2024-02-19

## 2024-02-26 ENCOUNTER — APPOINTMENT (OUTPATIENT)
Dept: BEHAVIORAL HEALTH | Age: 39
End: 2024-02-26

## 2024-02-26 DIAGNOSIS — F90.0 ATTENTION DEFICIT HYPERACTIVITY DISORDER (ADHD), PREDOMINANTLY INATTENTIVE TYPE: Primary | ICD-10-CM

## 2024-02-26 DIAGNOSIS — F33.42 RECURRENT MAJOR DEPRESSIVE DISORDER, IN FULL REMISSION (CMD): ICD-10-CM

## 2024-02-26 DIAGNOSIS — F43.10 PTSD (POST-TRAUMATIC STRESS DISORDER): ICD-10-CM

## 2024-02-26 PROCEDURE — 99215 OFFICE O/P EST HI 40 MIN: CPT | Performed by: REGISTERED NURSE

## 2024-02-26 RX ORDER — DEXTROAMPHETAMINE SULFATE 10 MG/1
10 CAPSULE, EXTENDED RELEASE ORAL DAILY
Qty: 30 CAPSULE | Refills: 0 | Status: SHIPPED | OUTPATIENT
Start: 2024-03-06

## 2024-03-04 ENCOUNTER — OFF PREMISE (OUTPATIENT)
Dept: OCCUPATIONAL MEDICINE | Age: 39
End: 2024-03-04

## 2024-03-04 DIAGNOSIS — Z02.89 VISIT FOR OCCUPATIONAL HEALTH EXAMINATION: ICD-10-CM

## 2024-03-04 DIAGNOSIS — Z02.89 ENCOUNTER FOR OCCUPATIONAL HEALTH EXAMINATION: Primary | ICD-10-CM

## 2024-03-04 PROCEDURE — 92552 PURE TONE AUDIOMETRY AIR: CPT | Performed by: PHYSICIAN ASSISTANT

## 2024-03-04 PROCEDURE — OH063 AUDIOMETRIC TESTING INTERP: Performed by: PHYSICIAN ASSISTANT

## 2024-03-14 ENCOUNTER — E-ADVICE (OUTPATIENT)
Dept: BEHAVIORAL HEALTH | Age: 39
End: 2024-03-14

## 2024-03-15 ENCOUNTER — APPOINTMENT (OUTPATIENT)
Dept: OCCUPATIONAL MEDICINE | Age: 39
End: 2024-03-15

## 2024-03-15 DIAGNOSIS — Z02.89 ENCOUNTER FOR OCCUPATIONAL HEALTH EXAMINATION: Primary | ICD-10-CM

## 2024-03-15 DIAGNOSIS — Z02.89 VISIT FOR OCCUPATIONAL HEALTH EXAMINATION: ICD-10-CM

## 2024-03-15 PROCEDURE — OH071 RESPIRATORY (PFT) QUESTIONNAIRE: Performed by: PHYSICIAN ASSISTANT

## 2024-03-21 ENCOUNTER — EXTERNAL RECORD (OUTPATIENT)
Dept: OTHER | Age: 39
End: 2024-03-21

## 2024-03-26 ENCOUNTER — TELEPHONE (OUTPATIENT)
Dept: BEHAVIORAL HEALTH | Age: 39
End: 2024-03-26

## 2024-03-26 ENCOUNTER — APPOINTMENT (OUTPATIENT)
Dept: BEHAVIORAL HEALTH | Age: 39
End: 2024-03-26

## 2024-03-26 DIAGNOSIS — F90.0 ATTENTION DEFICIT HYPERACTIVITY DISORDER (ADHD), PREDOMINANTLY INATTENTIVE TYPE: Primary | ICD-10-CM

## 2024-03-26 DIAGNOSIS — F33.42 MAJOR DEPRESSIVE DISORDER, RECURRENT EPISODE, IN FULL REMISSION (CMD): ICD-10-CM

## 2024-03-26 DIAGNOSIS — F43.10 PTSD (POST-TRAUMATIC STRESS DISORDER): ICD-10-CM

## 2024-03-26 PROCEDURE — 99215 OFFICE O/P EST HI 40 MIN: CPT | Performed by: REGISTERED NURSE

## 2024-03-26 RX ORDER — DEXTROAMPHETAMINE SULFATE 10 MG/1
20 CAPSULE, EXTENDED RELEASE ORAL DAILY
Qty: 60 CAPSULE | Refills: 0 | Status: SHIPPED | OUTPATIENT
Start: 2024-04-10

## 2024-05-02 ENCOUNTER — OFFICE VISIT (OUTPATIENT)
Dept: PULMONOLOGY | Age: 39
End: 2024-05-02
Attending: INTERNAL MEDICINE

## 2024-05-02 VITALS
HEIGHT: 68 IN | OXYGEN SATURATION: 100 % | SYSTOLIC BLOOD PRESSURE: 112 MMHG | DIASTOLIC BLOOD PRESSURE: 76 MMHG | BODY MASS INDEX: 27.57 KG/M2 | HEART RATE: 60 BPM | RESPIRATION RATE: 16 BRPM | WEIGHT: 181.9 LBS

## 2024-05-02 DIAGNOSIS — G47.63 BRUXISM, SLEEP-RELATED: ICD-10-CM

## 2024-05-02 DIAGNOSIS — F43.10 PTSD (POST-TRAUMATIC STRESS DISORDER): ICD-10-CM

## 2024-05-02 DIAGNOSIS — G47.10 HYPERSOMNIA: Primary | ICD-10-CM

## 2024-05-02 DIAGNOSIS — R06.83 SNORING: ICD-10-CM

## 2024-05-02 DIAGNOSIS — G47.26 SHIFT WORK SLEEP DISORDER: ICD-10-CM

## 2024-05-02 DIAGNOSIS — F33.42 MAJOR DEPRESSIVE DISORDER, RECURRENT EPISODE, IN FULL REMISSION (CMD): ICD-10-CM

## 2024-05-02 DIAGNOSIS — F90.0 ATTENTION DEFICIT HYPERACTIVITY DISORDER (ADHD), PREDOMINANTLY INATTENTIVE TYPE: ICD-10-CM

## 2024-05-02 PROCEDURE — 99211 OFF/OP EST MAY X REQ PHY/QHP: CPT

## 2024-05-07 ENCOUNTER — APPOINTMENT (OUTPATIENT)
Dept: BEHAVIORAL HEALTH | Age: 39
End: 2024-05-07

## 2024-05-07 DIAGNOSIS — F33.42 MAJOR DEPRESSIVE DISORDER, RECURRENT EPISODE, IN FULL REMISSION (CMD): ICD-10-CM

## 2024-05-07 DIAGNOSIS — F90.0 ATTENTION DEFICIT HYPERACTIVITY DISORDER (ADHD), PREDOMINANTLY INATTENTIVE TYPE: Primary | ICD-10-CM

## 2024-05-07 DIAGNOSIS — F43.10 PTSD (POST-TRAUMATIC STRESS DISORDER): ICD-10-CM

## 2024-05-07 PROCEDURE — 99215 OFFICE O/P EST HI 40 MIN: CPT | Performed by: REGISTERED NURSE

## 2024-05-07 RX ORDER — CLONIDINE HYDROCHLORIDE 0.1 MG/1
0.1 TABLET ORAL 2 TIMES DAILY
Qty: 90 TABLET | Refills: 3 | Status: SHIPPED | OUTPATIENT
Start: 2024-05-07

## 2024-05-07 RX ORDER — DEXTROAMPHETAMINE SULFATE 15 MG/1
30 CAPSULE, EXTENDED RELEASE ORAL DAILY
Qty: 60 CAPSULE | Refills: 0 | Status: SHIPPED | OUTPATIENT
Start: 2024-05-07

## 2024-05-28 ENCOUNTER — TELEPHONE (OUTPATIENT)
Dept: PULMONOLOGY | Age: 39
End: 2024-05-28

## 2024-05-28 DIAGNOSIS — R06.83 SNORING: Primary | ICD-10-CM

## 2024-05-28 DIAGNOSIS — G47.26 SHIFT WORK SLEEP DISORDER: ICD-10-CM

## 2024-05-28 DIAGNOSIS — G47.10 HYPERSOMNIA: ICD-10-CM

## 2024-08-12 ENCOUNTER — E-ADVICE (OUTPATIENT)
Dept: BEHAVIORAL HEALTH | Age: 39
End: 2024-08-12

## 2024-08-12 ENCOUNTER — APPOINTMENT (OUTPATIENT)
Dept: BEHAVIORAL HEALTH | Age: 39
End: 2024-08-12

## 2024-08-12 DIAGNOSIS — F33.42 MAJOR DEPRESSIVE DISORDER, RECURRENT EPISODE, IN FULL REMISSION (CMD): ICD-10-CM

## 2024-08-12 DIAGNOSIS — F90.0 ATTENTION DEFICIT HYPERACTIVITY DISORDER (ADHD), PREDOMINANTLY INATTENTIVE TYPE: Primary | ICD-10-CM

## 2024-08-12 DIAGNOSIS — F43.10 PTSD (POST-TRAUMATIC STRESS DISORDER): ICD-10-CM

## 2024-08-12 PROCEDURE — 99215 OFFICE O/P EST HI 40 MIN: CPT | Performed by: REGISTERED NURSE

## 2024-08-12 RX ORDER — LISDEXAMFETAMINE DIMESYLATE 40 MG/1
40 CAPSULE ORAL EVERY MORNING
Qty: 30 CAPSULE | Refills: 0 | Status: SHIPPED | OUTPATIENT
Start: 2024-08-12

## 2024-09-25 ENCOUNTER — APPOINTMENT (OUTPATIENT)
Dept: BEHAVIORAL HEALTH | Age: 39
End: 2024-09-25

## 2024-09-25 DIAGNOSIS — F43.10 PTSD (POST-TRAUMATIC STRESS DISORDER): ICD-10-CM

## 2024-09-25 DIAGNOSIS — F33.42 MAJOR DEPRESSIVE DISORDER, RECURRENT EPISODE, IN FULL REMISSION (CMD): ICD-10-CM

## 2024-09-25 DIAGNOSIS — F90.0 ATTENTION DEFICIT HYPERACTIVITY DISORDER (ADHD), PREDOMINANTLY INATTENTIVE TYPE: Primary | ICD-10-CM

## 2024-09-25 PROCEDURE — 99215 OFFICE O/P EST HI 40 MIN: CPT | Performed by: REGISTERED NURSE

## 2024-09-25 RX ORDER — CLONIDINE HYDROCHLORIDE 0.1 MG/1
0.1 TABLET ORAL PRN
Qty: 90 TABLET | Refills: 3 | Status: SHIPPED | OUTPATIENT
Start: 2024-09-25

## 2024-09-25 RX ORDER — LISDEXAMFETAMINE DIMESYLATE 40 MG/1
40 CAPSULE ORAL EVERY MORNING
Qty: 30 CAPSULE | Refills: 0 | Status: SHIPPED | OUTPATIENT
Start: 2024-09-25

## 2024-10-21 RX ORDER — LISDEXAMFETAMINE DIMESYLATE 40 MG/1
40 CAPSULE ORAL EVERY MORNING
Qty: 30 CAPSULE | Refills: 0 | Status: SHIPPED | OUTPATIENT
Start: 2024-10-25 | End: 2024-11-27 | Stop reason: SDUPTHER

## 2024-11-08 SDOH — ECONOMIC STABILITY: GENERAL: WOULD YOU LIKE HELP WITH ANY OF THE FOLLOWING NEEDS?: I DON'T WANT HELP WITH ANY OF THESE

## 2024-11-08 SDOH — ECONOMIC STABILITY: HOUSING INSECURITY: WHAT IS YOUR LIVING SITUATION TODAY?: I HAVE A STEADY PLACE TO LIVE

## 2024-11-08 SDOH — ECONOMIC STABILITY: FOOD INSECURITY: WITHIN THE PAST 12 MONTHS, THE FOOD YOU BOUGHT JUST DIDN'T LAST AND YOU DIDN'T HAVE MONEY TO GET MORE.: NEVER TRUE

## 2024-11-08 SDOH — ECONOMIC STABILITY: TRANSPORTATION INSECURITY
IN THE PAST 12 MONTHS, HAS LACK OF RELIABLE TRANSPORTATION KEPT YOU FROM MEDICAL APPOINTMENTS, MEETINGS, WORK OR FROM GETTING THINGS NEEDED FOR DAILY LIVING?: NO

## 2024-11-08 SDOH — ECONOMIC STABILITY: HOUSING INSECURITY: DO YOU HAVE PROBLEMS WITH ANY OF THE FOLLOWING?: NONE OF THE ABOVE

## 2024-11-08 ASSESSMENT — SOCIAL DETERMINANTS OF HEALTH (SDOH): IN THE PAST 12 MONTHS, HAS THE ELECTRIC, GAS, OIL, OR WATER COMPANY THREATENED TO SHUT OFF SERVICE IN YOUR HOME?: NO

## 2024-11-15 ENCOUNTER — LAB SERVICES (OUTPATIENT)
Dept: LAB | Age: 39
End: 2024-11-15

## 2024-11-15 ENCOUNTER — APPOINTMENT (OUTPATIENT)
Dept: FAMILY MEDICINE | Age: 39
End: 2024-11-15

## 2024-11-15 VITALS
WEIGHT: 174.27 LBS | BODY MASS INDEX: 26.41 KG/M2 | SYSTOLIC BLOOD PRESSURE: 105 MMHG | RESPIRATION RATE: 16 BRPM | OXYGEN SATURATION: 100 % | HEART RATE: 68 BPM | HEIGHT: 68 IN | TEMPERATURE: 97.6 F | DIASTOLIC BLOOD PRESSURE: 70 MMHG

## 2024-11-15 DIAGNOSIS — Z00.00 ANNUAL PHYSICAL EXAM: Primary | ICD-10-CM

## 2024-11-15 DIAGNOSIS — Z00.00 ANNUAL PHYSICAL EXAM: ICD-10-CM

## 2024-11-15 DIAGNOSIS — Z23 NEED FOR VACCINATION: ICD-10-CM

## 2024-11-15 LAB
BASOPHILS # BLD: 0 K/MCL (ref 0–0.3)
BASOPHILS NFR BLD: 1 %
DEPRECATED RDW RBC: 44.2 FL (ref 39–50)
EOSINOPHIL # BLD: 0.1 K/MCL (ref 0–0.5)
EOSINOPHIL NFR BLD: 4 %
ERYTHROCYTE [DISTWIDTH] IN BLOOD: 13.6 % (ref 11–15)
HCT VFR BLD CALC: 48.3 % (ref 39–51)
HGB BLD-MCNC: 16.1 G/DL (ref 13–17)
IMM GRANULOCYTES # BLD AUTO: 0 K/MCL (ref 0–0.2)
IMM GRANULOCYTES # BLD: 0 %
LYMPHOCYTES # BLD: 1.7 K/MCL (ref 1–4.8)
LYMPHOCYTES NFR BLD: 44 %
MCH RBC QN AUTO: 29.6 PG (ref 26–34)
MCHC RBC AUTO-ENTMCNC: 33.3 G/DL (ref 32–36.5)
MCV RBC AUTO: 88.8 FL (ref 78–100)
MONOCYTES # BLD: 0.4 K/MCL (ref 0.3–0.9)
MONOCYTES NFR BLD: 9 %
NEUTROPHILS # BLD: 1.6 K/MCL (ref 1.8–7.7)
NEUTROPHILS NFR BLD: 42 %
NRBC BLD MANUAL-RTO: 0 /100 WBC
PLATELET # BLD AUTO: 238 K/MCL (ref 140–450)
RBC # BLD: 5.44 MIL/MCL (ref 4.5–5.9)
WBC # BLD: 3.9 K/MCL (ref 4.2–11)

## 2024-11-15 PROCEDURE — 36415 COLL VENOUS BLD VENIPUNCTURE: CPT | Performed by: INTERNAL MEDICINE

## 2024-11-15 PROCEDURE — 85025 COMPLETE CBC W/AUTO DIFF WBC: CPT | Performed by: CLINICAL MEDICAL LABORATORY

## 2024-11-15 PROCEDURE — 80061 LIPID PANEL: CPT | Performed by: CLINICAL MEDICAL LABORATORY

## 2024-11-15 PROCEDURE — 80053 COMPREHEN METABOLIC PANEL: CPT | Performed by: CLINICAL MEDICAL LABORATORY

## 2024-11-15 ASSESSMENT — PATIENT HEALTH QUESTIONNAIRE - PHQ9
1. LITTLE INTEREST OR PLEASURE IN DOING THINGS: NOT AT ALL
SUM OF ALL RESPONSES TO PHQ9 QUESTIONS 1 AND 2: 0
2. FEELING DOWN, DEPRESSED OR HOPELESS: NOT AT ALL
CLINICAL INTERPRETATION OF PHQ2 SCORE: NO FURTHER SCREENING NEEDED
SUM OF ALL RESPONSES TO PHQ9 QUESTIONS 1 AND 2: 0

## 2024-11-15 ASSESSMENT — PAIN SCALES - GENERAL: PAINLEVEL: 0

## 2024-11-16 LAB
ALBUMIN SERPL-MCNC: 4.5 G/DL (ref 3.4–5)
ALBUMIN/GLOB SERPL: 1.4 {RATIO} (ref 1–2.4)
ALP SERPL-CCNC: 47 UNITS/L (ref 45–117)
ALT SERPL-CCNC: 32 UNITS/L
ANION GAP SERPL CALC-SCNC: 14 MMOL/L (ref 7–19)
AST SERPL-CCNC: 17 UNITS/L
BILIRUB SERPL-MCNC: 0.7 MG/DL (ref 0.2–1)
BUN SERPL-MCNC: 21 MG/DL (ref 6–20)
BUN/CREAT SERPL: 18 (ref 7–25)
CALCIUM SERPL-MCNC: 9.2 MG/DL (ref 8.4–10.2)
CHLORIDE SERPL-SCNC: 108 MMOL/L (ref 97–110)
CHOLEST SERPL-MCNC: 219 MG/DL
CHOLEST/HDLC SERPL: 4.2 {RATIO}
CO2 SERPL-SCNC: 22 MMOL/L (ref 21–32)
CREAT SERPL-MCNC: 1.2 MG/DL (ref 0.67–1.17)
EGFRCR SERPLBLD CKD-EPI 2021: 79 ML/MIN/{1.73_M2}
FASTING DURATION TIME PATIENT: 13 HOURS (ref 0–999)
GLOBULIN SER-MCNC: 3.3 G/DL (ref 2–4)
GLUCOSE SERPL-MCNC: 87 MG/DL (ref 70–99)
HDLC SERPL-MCNC: 52 MG/DL
LDLC SERPL CALC-MCNC: 152 MG/DL
NONHDLC SERPL-MCNC: 167 MG/DL
POTASSIUM SERPL-SCNC: 4.6 MMOL/L (ref 3.4–5.1)
PROT SERPL-MCNC: 7.8 G/DL (ref 6.4–8.2)
SODIUM SERPL-SCNC: 139 MMOL/L (ref 135–145)
TRIGL SERPL-MCNC: 73 MG/DL

## 2024-11-18 ENCOUNTER — TELEPHONE (OUTPATIENT)
Dept: FAMILY MEDICINE | Age: 39
End: 2024-11-18

## 2024-11-18 DIAGNOSIS — R79.89 HIGH SERUM LOW-DENSITY LIPOPROTEIN (LDL): Primary | ICD-10-CM

## 2024-11-27 ENCOUNTER — APPOINTMENT (OUTPATIENT)
Dept: BEHAVIORAL HEALTH | Age: 39
End: 2024-11-27

## 2024-11-27 DIAGNOSIS — F41.1 GENERALIZED ANXIETY DISORDER: Primary | ICD-10-CM

## 2024-11-27 DIAGNOSIS — F90.0 ATTENTION DEFICIT HYPERACTIVITY DISORDER (ADHD), PREDOMINANTLY INATTENTIVE TYPE: ICD-10-CM

## 2024-11-27 DIAGNOSIS — F33.0 MAJOR DEPRESSIVE DISORDER, RECURRENT EPISODE, MILD (CMD): ICD-10-CM

## 2024-11-27 DIAGNOSIS — F43.10 PTSD (POST-TRAUMATIC STRESS DISORDER): ICD-10-CM

## 2024-11-27 PROCEDURE — 90791 PSYCH DIAGNOSTIC EVALUATION: CPT | Performed by: SOCIAL WORKER

## 2024-12-03 RX ORDER — LISDEXAMFETAMINE DIMESYLATE 40 MG/1
40 CAPSULE ORAL EVERY MORNING
Qty: 30 CAPSULE | Refills: 0 | Status: SHIPPED | OUTPATIENT
Start: 2024-12-03 | End: 2025-01-24 | Stop reason: SDUPTHER

## 2024-12-03 RX ORDER — CLONIDINE HYDROCHLORIDE 0.1 MG/1
0.1 TABLET ORAL PRN
Qty: 90 TABLET | Refills: 3 | OUTPATIENT
Start: 2024-12-03

## 2024-12-18 ENCOUNTER — APPOINTMENT (OUTPATIENT)
Dept: BEHAVIORAL HEALTH | Age: 39
End: 2024-12-18

## 2024-12-18 DIAGNOSIS — F43.10 PTSD (POST-TRAUMATIC STRESS DISORDER): ICD-10-CM

## 2024-12-18 DIAGNOSIS — F33.0 MAJOR DEPRESSIVE DISORDER, RECURRENT EPISODE, MILD (CMD): ICD-10-CM

## 2024-12-18 DIAGNOSIS — F41.1 GENERALIZED ANXIETY DISORDER: Primary | ICD-10-CM

## 2024-12-18 DIAGNOSIS — F90.0 ATTENTION DEFICIT HYPERACTIVITY DISORDER (ADHD), PREDOMINANTLY INATTENTIVE TYPE: ICD-10-CM

## 2025-01-22 ENCOUNTER — APPOINTMENT (OUTPATIENT)
Dept: BEHAVIORAL HEALTH | Age: 40
End: 2025-01-22

## 2025-01-24 DIAGNOSIS — F90.0 ATTENTION DEFICIT HYPERACTIVITY DISORDER (ADHD), PREDOMINANTLY INATTENTIVE TYPE: Primary | ICD-10-CM

## 2025-01-28 RX ORDER — LISDEXAMFETAMINE DIMESYLATE 40 MG/1
40 CAPSULE ORAL EVERY MORNING
Qty: 30 CAPSULE | Refills: 0 | Status: SHIPPED | OUTPATIENT
Start: 2025-01-28 | End: 2025-02-19 | Stop reason: SDUPTHER

## 2025-01-29 ENCOUNTER — APPOINTMENT (OUTPATIENT)
Dept: BEHAVIORAL HEALTH | Age: 40
End: 2025-01-29

## 2025-01-31 ENCOUNTER — TELEPHONE (OUTPATIENT)
Dept: BEHAVIORAL HEALTH | Age: 40
End: 2025-01-31

## 2025-02-03 ENCOUNTER — APPOINTMENT (OUTPATIENT)
Dept: BEHAVIORAL HEALTH | Age: 40
End: 2025-02-03

## 2025-02-06 RX ORDER — CLONIDINE HYDROCHLORIDE 0.1 MG/1
0.1 TABLET ORAL PRN
Qty: 90 TABLET | Refills: 3 | OUTPATIENT
Start: 2025-02-06

## 2025-02-19 ENCOUNTER — APPOINTMENT (OUTPATIENT)
Dept: BEHAVIORAL HEALTH | Age: 40
End: 2025-02-19

## 2025-02-19 ENCOUNTER — APPOINTMENT (OUTPATIENT)
Dept: LAB | Age: 40
End: 2025-02-19

## 2025-02-19 DIAGNOSIS — F33.0 MAJOR DEPRESSIVE DISORDER, RECURRENT EPISODE, MILD (CMD): Primary | ICD-10-CM

## 2025-02-19 DIAGNOSIS — F43.10 PTSD (POST-TRAUMATIC STRESS DISORDER): ICD-10-CM

## 2025-02-19 DIAGNOSIS — F90.0 ATTENTION DEFICIT HYPERACTIVITY DISORDER (ADHD), PREDOMINANTLY INATTENTIVE TYPE: ICD-10-CM

## 2025-02-19 PROCEDURE — 99215 OFFICE O/P EST HI 40 MIN: CPT | Performed by: REGISTERED NURSE

## 2025-02-19 RX ORDER — CLONIDINE HYDROCHLORIDE 0.1 MG/1
.1-.3 TABLET ORAL AT BEDTIME
Qty: 270 TABLET | Refills: 1 | Status: SHIPPED | OUTPATIENT
Start: 2025-02-19

## 2025-02-19 RX ORDER — LISDEXAMFETAMINE DIMESYLATE 50 MG/1
50 CAPSULE ORAL EVERY MORNING
Qty: 30 CAPSULE | Refills: 0 | Status: SHIPPED | OUTPATIENT
Start: 2025-02-27

## 2025-02-24 ENCOUNTER — E-ADVICE (OUTPATIENT)
Dept: FAMILY MEDICINE | Age: 40
End: 2025-02-24

## 2025-02-26 ENCOUNTER — TELEPHONE (OUTPATIENT)
Dept: FAMILY MEDICINE | Age: 40
End: 2025-02-26

## 2025-03-04 ENCOUNTER — OFF PREMISE (OUTPATIENT)
Dept: OCCUPATIONAL MEDICINE | Age: 40
End: 2025-03-04

## 2025-03-04 DIAGNOSIS — Z02.89 VISIT FOR OCCUPATIONAL HEALTH EXAMINATION: Primary | ICD-10-CM

## 2025-03-04 PROCEDURE — OH071 OSHA RESPIRATORY QUESTIONNAIRE: Performed by: NURSE PRACTITIONER

## 2025-03-05 ENCOUNTER — APPOINTMENT (OUTPATIENT)
Dept: OCCUPATIONAL MEDICINE | Age: 40
End: 2025-03-05

## 2025-03-05 PROCEDURE — OH063 AUDIOMETRIC TESTING INTERP: Performed by: PREVENTIVE MEDICINE

## 2025-03-05 PROCEDURE — 92552 PURE TONE AUDIOMETRY AIR: CPT | Performed by: PREVENTIVE MEDICINE

## 2025-03-21 ENCOUNTER — APPOINTMENT (OUTPATIENT)
Dept: FAMILY MEDICINE | Age: 40
End: 2025-03-21

## 2025-03-21 VITALS
OXYGEN SATURATION: 99 % | RESPIRATION RATE: 18 BRPM | TEMPERATURE: 98.2 F | WEIGHT: 177.69 LBS | SYSTOLIC BLOOD PRESSURE: 117 MMHG | HEART RATE: 70 BPM | BODY MASS INDEX: 26.93 KG/M2 | DIASTOLIC BLOOD PRESSURE: 75 MMHG | HEIGHT: 68 IN

## 2025-03-21 DIAGNOSIS — F90.0 ATTENTION DEFICIT HYPERACTIVITY DISORDER (ADHD), PREDOMINANTLY INATTENTIVE TYPE: ICD-10-CM

## 2025-03-21 DIAGNOSIS — M25.50 ARTHRALGIA, UNSPECIFIED JOINT: ICD-10-CM

## 2025-03-21 DIAGNOSIS — Z76.89 ENCOUNTER FOR INTEGRATIVE MEDICINE VISIT: Primary | ICD-10-CM

## 2025-03-21 ASSESSMENT — PATIENT HEALTH QUESTIONNAIRE - PHQ9
1. LITTLE INTEREST OR PLEASURE IN DOING THINGS: NOT AT ALL
SUM OF ALL RESPONSES TO PHQ9 QUESTIONS 1 AND 2: 0
SUM OF ALL RESPONSES TO PHQ9 QUESTIONS 1 AND 2: 0
2. FEELING DOWN, DEPRESSED OR HOPELESS: NOT AT ALL
CLINICAL INTERPRETATION OF PHQ2 SCORE: NO FURTHER SCREENING NEEDED

## 2025-03-22 ENCOUNTER — E-ADVICE (OUTPATIENT)
Dept: FAMILY MEDICINE | Age: 40
End: 2025-03-22

## 2025-03-27 DIAGNOSIS — F90.0 ATTENTION DEFICIT HYPERACTIVITY DISORDER (ADHD), PREDOMINANTLY INATTENTIVE TYPE: ICD-10-CM

## 2025-03-31 RX ORDER — LISDEXAMFETAMINE DIMESYLATE 50 MG/1
50 CAPSULE ORAL EVERY MORNING
Qty: 30 CAPSULE | Refills: 0 | Status: SHIPPED | OUTPATIENT
Start: 2025-03-31 | End: 2025-04-30 | Stop reason: SDUPTHER

## 2025-04-30 ENCOUNTER — APPOINTMENT (OUTPATIENT)
Dept: BEHAVIORAL HEALTH | Age: 40
End: 2025-04-30

## 2025-04-30 DIAGNOSIS — F41.1 GENERALIZED ANXIETY DISORDER: ICD-10-CM

## 2025-04-30 DIAGNOSIS — F43.10 PTSD (POST-TRAUMATIC STRESS DISORDER): ICD-10-CM

## 2025-04-30 DIAGNOSIS — F90.0 ATTENTION DEFICIT HYPERACTIVITY DISORDER (ADHD), PREDOMINANTLY INATTENTIVE TYPE: Primary | ICD-10-CM

## 2025-04-30 DIAGNOSIS — F33.0 MAJOR DEPRESSIVE DISORDER, RECURRENT EPISODE, MILD (CMD): ICD-10-CM

## 2025-04-30 PROCEDURE — 99215 OFFICE O/P EST HI 40 MIN: CPT | Performed by: REGISTERED NURSE

## 2025-04-30 RX ORDER — DEXTROAMPHETAMINE SACCHARATE, AMPHETAMINE ASPARTATE, DEXTROAMPHETAMINE SULFATE AND AMPHETAMINE SULFATE 2.5; 2.5; 2.5; 2.5 MG/1; MG/1; MG/1; MG/1
10 TABLET ORAL DAILY
Qty: 30 TABLET | Refills: 0 | Status: SHIPPED | OUTPATIENT
Start: 2025-04-30

## 2025-04-30 RX ORDER — LISDEXAMFETAMINE DIMESYLATE 50 MG/1
50 CAPSULE ORAL EVERY MORNING
Qty: 30 CAPSULE | Refills: 0 | Status: SHIPPED | OUTPATIENT
Start: 2025-04-30

## 2025-05-19 DIAGNOSIS — F90.0 ATTENTION DEFICIT HYPERACTIVITY DISORDER (ADHD), PREDOMINANTLY INATTENTIVE TYPE: ICD-10-CM

## 2025-05-23 RX ORDER — LISDEXAMFETAMINE DIMESYLATE 50 MG/1
50 CAPSULE ORAL EVERY MORNING
Qty: 30 CAPSULE | Refills: 0 | Status: SHIPPED | OUTPATIENT
Start: 2025-06-04

## 2025-06-12 DIAGNOSIS — F90.0 ATTENTION DEFICIT HYPERACTIVITY DISORDER (ADHD), PREDOMINANTLY INATTENTIVE TYPE: ICD-10-CM

## 2025-06-12 RX ORDER — LISDEXAMFETAMINE DIMESYLATE 50 MG/1
50 CAPSULE ORAL EVERY MORNING
Qty: 30 CAPSULE | Refills: 0 | Status: CANCELLED | OUTPATIENT
Start: 2025-06-12

## 2025-06-12 RX ORDER — CLONIDINE HYDROCHLORIDE 0.1 MG/1
.1-.3 TABLET ORAL AT BEDTIME
Qty: 270 TABLET | Refills: 1 | Status: CANCELLED | OUTPATIENT
Start: 2025-06-12

## 2025-06-16 ENCOUNTER — APPOINTMENT (OUTPATIENT)
Dept: FAMILY MEDICINE | Age: 40
End: 2025-06-16

## 2025-06-17 RX ORDER — DEXTROAMPHETAMINE SACCHARATE, AMPHETAMINE ASPARTATE, DEXTROAMPHETAMINE SULFATE AND AMPHETAMINE SULFATE 2.5; 2.5; 2.5; 2.5 MG/1; MG/1; MG/1; MG/1
10 TABLET ORAL DAILY
Qty: 30 TABLET | Refills: 0 | Status: SHIPPED | OUTPATIENT
Start: 2025-06-17

## 2025-07-23 ENCOUNTER — APPOINTMENT (OUTPATIENT)
Dept: RADIOLOGY | Facility: HOSPITAL | Age: 40
End: 2025-07-23
Payer: COMMERCIAL

## 2025-07-23 ENCOUNTER — CLINICAL SUPPORT (OUTPATIENT)
Dept: EMERGENCY MEDICINE | Facility: HOSPITAL | Age: 40
End: 2025-07-23
Payer: COMMERCIAL

## 2025-07-23 ENCOUNTER — HOSPITAL ENCOUNTER (INPATIENT)
Facility: HOSPITAL | Age: 40
LOS: 2 days | Discharge: HOME | End: 2025-07-26
Attending: EMERGENCY MEDICINE | Admitting: STUDENT IN AN ORGANIZED HEALTH CARE EDUCATION/TRAINING PROGRAM
Payer: COMMERCIAL

## 2025-07-23 DIAGNOSIS — E83.42 HYPOMAGNESEMIA: ICD-10-CM

## 2025-07-23 DIAGNOSIS — F10.930 ALCOHOL WITHDRAWAL SYNDROME WITHOUT COMPLICATION (MULTI): Primary | ICD-10-CM

## 2025-07-23 DIAGNOSIS — J18.9 PNEUMONIA OF LEFT LOWER LOBE DUE TO INFECTIOUS ORGANISM: ICD-10-CM

## 2025-07-23 LAB
ALBUMIN SERPL BCP-MCNC: 3.9 G/DL (ref 3.4–5)
ALP SERPL-CCNC: 669 U/L (ref 33–120)
ALT SERPL W P-5'-P-CCNC: 7 U/L (ref 10–52)
ANION GAP BLDV CALCULATED.4IONS-SCNC: 14 MMOL/L (ref 10–25)
ANION GAP SERPL CALC-SCNC: 19 MMOL/L (ref 10–20)
AST SERPL W P-5'-P-CCNC: 33 U/L (ref 9–39)
BASE EXCESS BLDV CALC-SCNC: 8.5 MMOL/L (ref -2–3)
BASOPHILS # BLD AUTO: 0.12 X10*3/UL (ref 0–0.1)
BASOPHILS NFR BLD AUTO: 0.8 %
BILIRUB SERPL-MCNC: 0.5 MG/DL (ref 0–1.2)
BODY TEMPERATURE: 37 DEGREES CELSIUS
BUN SERPL-MCNC: 6 MG/DL (ref 6–23)
CA-I BLDV-SCNC: 1.04 MMOL/L (ref 1.1–1.33)
CALCIUM SERPL-MCNC: 8.9 MG/DL (ref 8.6–10.6)
CHLORIDE BLDV-SCNC: 94 MMOL/L (ref 98–107)
CHLORIDE SERPL-SCNC: 91 MMOL/L (ref 98–107)
CO2 SERPL-SCNC: 32 MMOL/L (ref 21–32)
CREAT SERPL-MCNC: 0.84 MG/DL (ref 0.5–1.3)
EGFRCR SERPLBLD CKD-EPI 2021: >90 ML/MIN/1.73M*2
EOSINOPHIL # BLD AUTO: 0 X10*3/UL (ref 0–0.7)
EOSINOPHIL NFR BLD AUTO: 0 %
ERYTHROCYTE [DISTWIDTH] IN BLOOD BY AUTOMATED COUNT: 16.2 % (ref 11.5–14.5)
GLUCOSE BLDV-MCNC: 203 MG/DL (ref 74–99)
GLUCOSE SERPL-MCNC: 194 MG/DL (ref 74–99)
HAV IGM SER QL: NONREACTIVE
HBV CORE IGM SER QL: NONREACTIVE
HBV SURFACE AG SERPL QL IA: NONREACTIVE
HCO3 BLDV-SCNC: 33.5 MMOL/L (ref 22–26)
HCT VFR BLD AUTO: 22 % (ref 41–52)
HCT VFR BLD EST: 54 % (ref 41–52)
HCV AB SER QL: NONREACTIVE
HGB BLD-MCNC: 7.6 G/DL (ref 13.5–17.5)
HGB BLDV-MCNC: 17.9 G/DL (ref 13.5–17.5)
IMM GRANULOCYTES # BLD AUTO: 0.12 X10*3/UL (ref 0–0.7)
IMM GRANULOCYTES NFR BLD AUTO: 0.8 % (ref 0–0.9)
INHALED O2 CONCENTRATION: 28 %
LACTATE BLDV-SCNC: 2.2 MMOL/L (ref 0.4–2)
LYMPHOCYTES # BLD AUTO: 0.61 X10*3/UL (ref 1.2–4.8)
LYMPHOCYTES NFR BLD AUTO: 4.1 %
MAGNESIUM SERPL-MCNC: 1.2 MG/DL (ref 1.6–2.4)
MCH RBC QN AUTO: 28.8 PG (ref 26–34)
MCHC RBC AUTO-ENTMCNC: 34.5 G/DL (ref 32–36)
MCV RBC AUTO: 83 FL (ref 80–100)
MONOCYTES # BLD AUTO: 1.01 X10*3/UL (ref 0.1–1)
MONOCYTES NFR BLD AUTO: 6.7 %
NEUTROPHILS # BLD AUTO: 13.12 X10*3/UL (ref 1.2–7.7)
NEUTROPHILS NFR BLD AUTO: 87.6 %
NRBC BLD-RTO: 0 /100 WBCS (ref 0–0)
OXYHGB MFR BLDV: 80.4 % (ref 45–75)
PCO2 BLDV: 45 MM HG (ref 41–51)
PH BLDV: 7.48 PH (ref 7.33–7.43)
PLATELET # BLD AUTO: 548 X10*3/UL (ref 150–450)
PO2 BLDV: 54 MM HG (ref 35–45)
POTASSIUM BLDV-SCNC: 4 MMOL/L (ref 3.5–5.3)
POTASSIUM SERPL-SCNC: 3.7 MMOL/L (ref 3.5–5.3)
PROT SERPL-MCNC: 8.2 G/DL (ref 6.4–8.2)
RBC # BLD AUTO: 2.64 X10*6/UL (ref 4.5–5.9)
SAO2 % BLDV: 82 % (ref 45–75)
SODIUM BLDV-SCNC: 137 MMOL/L (ref 136–145)
SODIUM SERPL-SCNC: 138 MMOL/L (ref 136–145)
WBC # BLD AUTO: 15 X10*3/UL (ref 4.4–11.3)

## 2025-07-23 PROCEDURE — 2500000004 HC RX 250 GENERAL PHARMACY W/ HCPCS (ALT 636 FOR OP/ED): Mod: SE

## 2025-07-23 PROCEDURE — 74177 CT ABD & PELVIS W/CONTRAST: CPT

## 2025-07-23 PROCEDURE — 93005 ELECTROCARDIOGRAM TRACING: CPT

## 2025-07-23 PROCEDURE — 84132 ASSAY OF SERUM POTASSIUM: CPT

## 2025-07-23 PROCEDURE — 96365 THER/PROPH/DIAG IV INF INIT: CPT

## 2025-07-23 PROCEDURE — 70450 CT HEAD/BRAIN W/O DYE: CPT | Performed by: RADIOLOGY

## 2025-07-23 PROCEDURE — 70450 CT HEAD/BRAIN W/O DYE: CPT

## 2025-07-23 PROCEDURE — 96375 TX/PRO/DX INJ NEW DRUG ADDON: CPT

## 2025-07-23 PROCEDURE — 72125 CT NECK SPINE W/O DYE: CPT | Performed by: RADIOLOGY

## 2025-07-23 PROCEDURE — 85025 COMPLETE CBC W/AUTO DIFF WBC: CPT

## 2025-07-23 PROCEDURE — 96368 THER/DIAG CONCURRENT INF: CPT

## 2025-07-23 PROCEDURE — 82077 ASSAY SPEC XCP UR&BREATH IA: CPT

## 2025-07-23 PROCEDURE — 74177 CT ABD & PELVIS W/CONTRAST: CPT | Mod: FOREIGN READ | Performed by: RADIOLOGY

## 2025-07-23 PROCEDURE — 99285 EMERGENCY DEPT VISIT HI MDM: CPT | Performed by: EMERGENCY MEDICINE

## 2025-07-23 PROCEDURE — 80074 ACUTE HEPATITIS PANEL: CPT

## 2025-07-23 PROCEDURE — 96361 HYDRATE IV INFUSION ADD-ON: CPT

## 2025-07-23 PROCEDURE — 72125 CT NECK SPINE W/O DYE: CPT

## 2025-07-23 PROCEDURE — 2550000001 HC RX 255 CONTRASTS: Mod: SE | Performed by: EMERGENCY MEDICINE

## 2025-07-23 PROCEDURE — 71275 CT ANGIOGRAPHY CHEST: CPT

## 2025-07-23 PROCEDURE — 99285 EMERGENCY DEPT VISIT HI MDM: CPT | Mod: 25 | Performed by: EMERGENCY MEDICINE

## 2025-07-23 PROCEDURE — 71275 CT ANGIOGRAPHY CHEST: CPT | Mod: FOREIGN READ | Performed by: RADIOLOGY

## 2025-07-23 PROCEDURE — 2500000004 HC RX 250 GENERAL PHARMACY W/ HCPCS (ALT 636 FOR OP/ED): Mod: SE | Performed by: EMERGENCY MEDICINE

## 2025-07-23 PROCEDURE — 83735 ASSAY OF MAGNESIUM: CPT

## 2025-07-23 PROCEDURE — 2500000001 HC RX 250 WO HCPCS SELF ADMINISTERED DRUGS (ALT 637 FOR MEDICARE OP)

## 2025-07-23 RX ORDER — PHENOBARBITAL SODIUM 65 MG/ML
10 INJECTION, SOLUTION INTRAMUSCULAR; INTRAVENOUS ONCE
Status: DISCONTINUED | OUTPATIENT
Start: 2025-07-23 | End: 2025-07-23

## 2025-07-23 RX ORDER — FOLIC ACID 1 MG/1
1 TABLET ORAL DAILY
Status: DISCONTINUED | OUTPATIENT
Start: 2025-07-23 | End: 2025-07-26 | Stop reason: HOSPADM

## 2025-07-23 RX ORDER — THIAMINE HYDROCHLORIDE 100 MG/ML
100 INJECTION, SOLUTION INTRAMUSCULAR; INTRAVENOUS DAILY
Status: COMPLETED | OUTPATIENT
Start: 2025-07-23 | End: 2025-07-25

## 2025-07-23 RX ORDER — MULTIVIT-MIN/IRON FUM/FOLIC AC 7.5 MG-4
1 TABLET ORAL DAILY
Status: DISCONTINUED | OUTPATIENT
Start: 2025-07-23 | End: 2025-07-26 | Stop reason: HOSPADM

## 2025-07-23 RX ORDER — PHENOBARBITAL 32.4 MG/1
10 TABLET ORAL ONCE
Status: DISCONTINUED | OUTPATIENT
Start: 2025-07-23 | End: 2025-07-23

## 2025-07-23 RX ORDER — LANOLIN ALCOHOL/MO/W.PET/CERES
100 CREAM (GRAM) TOPICAL DAILY
Status: DISCONTINUED | OUTPATIENT
Start: 2025-07-26 | End: 2025-07-25

## 2025-07-23 RX ORDER — MAGNESIUM SULFATE HEPTAHYDRATE 40 MG/ML
2 INJECTION, SOLUTION INTRAVENOUS ONCE
Status: COMPLETED | OUTPATIENT
Start: 2025-07-23 | End: 2025-07-24

## 2025-07-23 RX ADMIN — IOHEXOL 80 ML: 350 INJECTION, SOLUTION INTRAVENOUS at 23:48

## 2025-07-23 RX ADMIN — HYDROMORPHONE HYDROCHLORIDE 0.2 MG: 1 INJECTION, SOLUTION INTRAMUSCULAR; INTRAVENOUS; SUBCUTANEOUS at 22:52

## 2025-07-23 RX ADMIN — Medication 1 TABLET: at 20:57

## 2025-07-23 RX ADMIN — THIAMINE HYDROCHLORIDE 100 MG: 100 INJECTION, SOLUTION INTRAMUSCULAR; INTRAVENOUS at 21:17

## 2025-07-23 RX ADMIN — SODIUM CHLORIDE, SODIUM LACTATE, POTASSIUM CHLORIDE, AND CALCIUM CHLORIDE 500 ML: .6; .31; .03; .02 INJECTION, SOLUTION INTRAVENOUS at 21:18

## 2025-07-23 RX ADMIN — FOLIC ACID 1 MG: 1 TABLET ORAL at 20:57

## 2025-07-23 RX ADMIN — PHENOBARBITAL SODIUM 637 MG: 130 INJECTION INTRAMUSCULAR; INTRAVENOUS at 23:58

## 2025-07-23 RX ADMIN — MAGNESIUM SULFATE HEPTAHYDRATE 2 G: 40 INJECTION, SOLUTION INTRAVENOUS at 23:57

## 2025-07-23 ASSESSMENT — LIFESTYLE VARIABLES
TACTILE DISTURBANCES: MILD ITCHING, PINS AND NEEDLES, BURNING OR NUMBNESS
TREMOR: 2
EVER FELT BAD OR GUILTY ABOUT YOUR DRINKING: YES
ANXIETY: NO ANXIETY, AT EASE
NAUSEA AND VOMITING: MILD NAUSEA WITH NO VOMITING
AUDITORY DISTURBANCES: NOT PRESENT
PAROXYSMAL SWEATS: NO SWEAT VISIBLE
PULSE: 130
HAVE YOU EVER FELT YOU SHOULD CUT DOWN ON YOUR DRINKING: YES
HEADACHE, FULLNESS IN HEAD: MILD
EVER HAD A DRINK FIRST THING IN THE MORNING TO STEADY YOUR NERVES TO GET RID OF A HANGOVER: YES
ORIENTATION AND CLOUDING OF SENSORIUM: CANNOT DO SERIAL ADDITIONS OR IS UNCERTAIN ABOUT DATE
HAVE PEOPLE ANNOYED YOU BY CRITICIZING YOUR DRINKING: NO
AGITATION: NORMAL ACTIVITY
TOTAL SCORE: 3
VISUAL DISTURBANCES: VERY MILD SENSITIVITY

## 2025-07-23 ASSESSMENT — PAIN - FUNCTIONAL ASSESSMENT
PAIN_FUNCTIONAL_ASSESSMENT: 0-10
PAIN_FUNCTIONAL_ASSESSMENT: 0-10

## 2025-07-23 ASSESSMENT — PAIN SCALES - GENERAL
PAINLEVEL_OUTOF10: 5 - MODERATE PAIN
PAINLEVEL_OUTOF10: 7

## 2025-07-23 ASSESSMENT — PAIN DESCRIPTION - LOCATION: LOCATION: HEAD

## 2025-07-24 PROBLEM — F10.930 ALCOHOL WITHDRAWAL SYNDROME WITHOUT COMPLICATION (MULTI): Status: ACTIVE | Noted: 2025-07-24

## 2025-07-24 LAB
AMPHETAMINES UR QL SCN: ABNORMAL
ANION GAP BLDV CALCULATED.4IONS-SCNC: 9 MMOL/L (ref 10–25)
APPEARANCE UR: CLEAR
ATRIAL RATE: 136 BPM
BARBITURATES UR QL SCN: ABNORMAL
BASE EXCESS BLDV CALC-SCNC: 10.8 MMOL/L (ref -2–3)
BENZODIAZ UR QL SCN: ABNORMAL
BILIRUB UR STRIP.AUTO-MCNC: NEGATIVE MG/DL
BODY TEMPERATURE: 37 DEGREES CELSIUS
BZE UR QL SCN: ABNORMAL
CA-I BLDV-SCNC: 1.06 MMOL/L (ref 1.1–1.33)
CANNABINOIDS UR QL SCN: ABNORMAL
CHLORIDE BLDV-SCNC: 97 MMOL/L (ref 98–107)
COLOR UR: YELLOW
ETHANOL SERPL-MCNC: <10 MG/DL
FENTANYL+NORFENTANYL UR QL SCN: ABNORMAL
GLUCOSE BLDV-MCNC: 186 MG/DL (ref 74–99)
GLUCOSE UR STRIP.AUTO-MCNC: NORMAL MG/DL
HCO3 BLDV-SCNC: 35.7 MMOL/L (ref 22–26)
HCT VFR BLD EST: 27 % (ref 41–52)
HGB BLDV-MCNC: 9 G/DL (ref 13.5–17.5)
HOLD SPECIMEN: NORMAL
INHALED O2 CONCENTRATION: 28 %
KETONES UR STRIP.AUTO-MCNC: ABNORMAL MG/DL
LACTATE BLDV-SCNC: 1.4 MMOL/L (ref 0.4–2)
LEGIONELLA AG UR QL: NEGATIVE
LEUKOCYTE ESTERASE UR QL STRIP.AUTO: NEGATIVE
METHADONE UR QL SCN: ABNORMAL
MUCOUS THREADS #/AREA URNS AUTO: NORMAL /LPF
NITRITE UR QL STRIP.AUTO: NEGATIVE
OPIATES UR QL SCN: ABNORMAL
OXYCODONE+OXYMORPHONE UR QL SCN: ABNORMAL
OXYHGB MFR BLDV: 77 % (ref 45–75)
P AXIS: 42 DEGREES
P OFFSET: 203 MS
P ONSET: 153 MS
PCO2 BLDV: 49 MM HG (ref 41–51)
PCP UR QL SCN: ABNORMAL
PH BLDV: 7.47 PH (ref 7.33–7.43)
PH UR STRIP.AUTO: 7 [PH]
PO2 BLDV: 51 MM HG (ref 35–45)
POTASSIUM BLDV-SCNC: 4.1 MMOL/L (ref 3.5–5.3)
PR INTERVAL: 130 MS
PROT UR STRIP.AUTO-MCNC: ABNORMAL MG/DL
Q ONSET: 218 MS
QRS COUNT: 22 BEATS
QRS DURATION: 74 MS
QT INTERVAL: 286 MS
QTC CALCULATION(BAZETT): 430 MS
QTC FREDERICIA: 375 MS
R AXIS: 51 DEGREES
RBC # UR STRIP.AUTO: NEGATIVE MG/DL
RBC #/AREA URNS AUTO: NORMAL /HPF
S PNEUM AG UR QL: NEGATIVE
SAO2 % BLDV: 79 % (ref 45–75)
SODIUM BLDV-SCNC: 138 MMOL/L (ref 136–145)
SP GR UR STRIP.AUTO: >1.05
T AXIS: 60 DEGREES
T OFFSET: 361 MS
UROBILINOGEN UR STRIP.AUTO-MCNC: NORMAL MG/DL
VENTRICULAR RATE: 136 BPM
WBC #/AREA URNS AUTO: NORMAL /HPF

## 2025-07-24 PROCEDURE — 80346 BENZODIAZEPINES1-12: CPT

## 2025-07-24 PROCEDURE — 96367 TX/PROPH/DG ADDL SEQ IV INF: CPT | Mod: 59

## 2025-07-24 PROCEDURE — 2500000005 HC RX 250 GENERAL PHARMACY W/O HCPCS: Mod: SE

## 2025-07-24 PROCEDURE — 2500000004 HC RX 250 GENERAL PHARMACY W/ HCPCS (ALT 636 FOR OP/ED): Mod: SE

## 2025-07-24 PROCEDURE — 1200000002 HC GENERAL ROOM WITH TELEMETRY DAILY

## 2025-07-24 PROCEDURE — 87081 CULTURE SCREEN ONLY: CPT | Performed by: STUDENT IN AN ORGANIZED HEALTH CARE EDUCATION/TRAINING PROGRAM

## 2025-07-24 PROCEDURE — 84132 ASSAY OF SERUM POTASSIUM: CPT

## 2025-07-24 PROCEDURE — 51798 US URINE CAPACITY MEASURE: CPT

## 2025-07-24 PROCEDURE — 81001 URINALYSIS AUTO W/SCOPE: CPT

## 2025-07-24 PROCEDURE — 96366 THER/PROPH/DIAG IV INF ADDON: CPT | Mod: 59

## 2025-07-24 PROCEDURE — 2500000001 HC RX 250 WO HCPCS SELF ADMINISTERED DRUGS (ALT 637 FOR MEDICARE OP): Mod: SE

## 2025-07-24 PROCEDURE — 87449 NOS EACH ORGANISM AG IA: CPT

## 2025-07-24 PROCEDURE — 51702 INSERT TEMP BLADDER CATH: CPT

## 2025-07-24 PROCEDURE — 87899 AGENT NOS ASSAY W/OPTIC: CPT

## 2025-07-24 PROCEDURE — 80307 DRUG TEST PRSMV CHEM ANLYZR: CPT

## 2025-07-24 PROCEDURE — 96375 TX/PRO/DX INJ NEW DRUG ADDON: CPT | Mod: 59

## 2025-07-24 PROCEDURE — 2500000002 HC RX 250 W HCPCS SELF ADMINISTERED DRUGS (ALT 637 FOR MEDICARE OP, ALT 636 FOR OP/ED): Mod: SE

## 2025-07-24 PROCEDURE — 99223 1ST HOSP IP/OBS HIGH 75: CPT

## 2025-07-24 PROCEDURE — 96376 TX/PRO/DX INJ SAME DRUG ADON: CPT | Mod: 59

## 2025-07-24 PROCEDURE — 93010 ELECTROCARDIOGRAM REPORT: CPT | Performed by: PHYSICIAN ASSISTANT

## 2025-07-24 PROCEDURE — 80345 DRUG SCREENING BARBITURATES: CPT

## 2025-07-24 RX ORDER — PHENOBARBITAL SODIUM 65 MG/ML
130 INJECTION, SOLUTION INTRAMUSCULAR; INTRAVENOUS ONCE
Status: DISCONTINUED | OUTPATIENT
Start: 2025-07-24 | End: 2025-07-24 | Stop reason: CLARIF

## 2025-07-24 RX ORDER — PHENOBARBITAL SODIUM 130 MG/ML
130 INJECTION, SOLUTION INTRAMUSCULAR; INTRAVENOUS ONCE
Status: COMPLETED | OUTPATIENT
Start: 2025-07-24 | End: 2025-07-24

## 2025-07-24 RX ORDER — TRAMADOL HYDROCHLORIDE 50 MG/1
50 TABLET, FILM COATED ORAL EVERY 6 HOURS PRN
COMMUNITY
End: 2025-07-26 | Stop reason: HOSPADM

## 2025-07-24 RX ORDER — ACETAMINOPHEN 325 MG/1
650 TABLET ORAL EVERY 6 HOURS PRN
Status: DISCONTINUED | OUTPATIENT
Start: 2025-07-24 | End: 2025-07-25

## 2025-07-24 RX ORDER — LEVOTHYROXINE SODIUM 125 UG/1
125 TABLET ORAL
Status: ON HOLD | COMMUNITY
End: 2025-07-26

## 2025-07-24 RX ORDER — GABAPENTIN 300 MG/1
300 CAPSULE ORAL 3 TIMES DAILY
Status: DISCONTINUED | OUTPATIENT
Start: 2025-07-24 | End: 2025-07-26 | Stop reason: HOSPADM

## 2025-07-24 RX ORDER — ENOXAPARIN SODIUM 100 MG/ML
40 INJECTION SUBCUTANEOUS EVERY 24 HOURS
Status: DISCONTINUED | OUTPATIENT
Start: 2025-07-24 | End: 2025-07-26 | Stop reason: HOSPADM

## 2025-07-24 RX ORDER — POLYETHYLENE GLYCOL 3350 17 G/17G
17 POWDER, FOR SOLUTION ORAL DAILY PRN
Status: DISCONTINUED | OUTPATIENT
Start: 2025-07-24 | End: 2025-07-26 | Stop reason: HOSPADM

## 2025-07-24 RX ORDER — ACETAMINOPHEN 500 MG
1000 TABLET ORAL EVERY 6 HOURS PRN
Status: ON HOLD | COMMUNITY
End: 2025-07-26

## 2025-07-24 RX ORDER — HYDROXYZINE HYDROCHLORIDE 25 MG/1
25 TABLET, FILM COATED ORAL EVERY 6 HOURS PRN
Status: ON HOLD | COMMUNITY
Start: 2025-06-06 | End: 2025-07-26

## 2025-07-24 RX ORDER — LANOLIN ALCOHOL/MO/W.PET/CERES
100 CREAM (GRAM) TOPICAL DAILY
COMMUNITY
End: 2025-07-26 | Stop reason: HOSPADM

## 2025-07-24 RX ORDER — ACETAMINOPHEN 325 MG/1
650 TABLET ORAL EVERY 6 HOURS PRN
Status: DISCONTINUED | OUTPATIENT
Start: 2025-07-24 | End: 2025-07-24

## 2025-07-24 RX ORDER — LORAZEPAM 1 MG/1
1 TABLET ORAL EVERY 2 HOUR PRN
Status: DISCONTINUED | OUTPATIENT
Start: 2025-07-24 | End: 2025-07-26 | Stop reason: HOSPADM

## 2025-07-24 RX ORDER — LORAZEPAM 2 MG/ML
0.5 INJECTION INTRAMUSCULAR EVERY 2 HOUR PRN
Status: DISCONTINUED | OUTPATIENT
Start: 2025-07-24 | End: 2025-07-24

## 2025-07-24 RX ORDER — GABAPENTIN 300 MG/1
300 CAPSULE ORAL 3 TIMES DAILY
Status: ON HOLD | COMMUNITY
Start: 2025-04-30 | End: 2025-07-26

## 2025-07-24 RX ORDER — DICYCLOMINE HYDROCHLORIDE 10 MG/1
20 CAPSULE ORAL 4 TIMES DAILY
COMMUNITY
Start: 2025-03-12 | End: 2025-07-24 | Stop reason: ALTCHOICE

## 2025-07-24 RX ORDER — LORAZEPAM 2 MG/ML
0.5 INJECTION INTRAMUSCULAR ONCE
Status: COMPLETED | OUTPATIENT
Start: 2025-07-24 | End: 2025-07-24

## 2025-07-24 RX ORDER — CEFTRIAXONE 2 G/50ML
2 INJECTION, SOLUTION INTRAVENOUS EVERY 24 HOURS
Status: DISCONTINUED | OUTPATIENT
Start: 2025-07-24 | End: 2025-07-26 | Stop reason: HOSPADM

## 2025-07-24 RX ORDER — CETIRIZINE HYDROCHLORIDE 10 MG/1
10 TABLET ORAL DAILY
COMMUNITY

## 2025-07-24 RX ORDER — QUETIAPINE FUMARATE 25 MG/1
25 TABLET, FILM COATED ORAL NIGHTLY
Status: ON HOLD | COMMUNITY
End: 2025-07-26

## 2025-07-24 RX ORDER — PHENOBARBITAL 32.4 MG/1
32.4 TABLET ORAL 3 TIMES DAILY
Status: DISCONTINUED | OUTPATIENT
Start: 2025-07-26 | End: 2025-07-26 | Stop reason: HOSPADM

## 2025-07-24 RX ORDER — HYDROXYZINE HYDROCHLORIDE 25 MG/1
25 TABLET, FILM COATED ORAL EVERY 6 HOURS PRN
Status: DISCONTINUED | OUTPATIENT
Start: 2025-07-24 | End: 2025-07-26 | Stop reason: HOSPADM

## 2025-07-24 RX ORDER — CALCIUM CARBONATE 300MG(750)
400 TABLET,CHEWABLE ORAL 2 TIMES DAILY
Status: ON HOLD | COMMUNITY
End: 2025-07-26

## 2025-07-24 RX ORDER — PHENOBARBITAL 32.4 MG/1
64.8 TABLET ORAL 3 TIMES DAILY
Status: DISCONTINUED | OUTPATIENT
Start: 2025-07-24 | End: 2025-07-26 | Stop reason: HOSPADM

## 2025-07-24 RX ORDER — ESCITALOPRAM OXALATE 10 MG/1
10 TABLET ORAL DAILY
Status: DISCONTINUED | OUTPATIENT
Start: 2025-07-24 | End: 2025-07-26 | Stop reason: HOSPADM

## 2025-07-24 RX ORDER — PHENOBARBITAL 32.4 MG/1
64.8 TABLET ORAL EVERY 6 HOURS PRN
Status: DISCONTINUED | OUTPATIENT
Start: 2025-07-24 | End: 2025-07-26 | Stop reason: HOSPADM

## 2025-07-24 RX ORDER — LORAZEPAM 0.5 MG/1
0.5 TABLET ORAL EVERY 2 HOUR PRN
Status: DISCONTINUED | OUTPATIENT
Start: 2025-07-24 | End: 2025-07-26 | Stop reason: HOSPADM

## 2025-07-24 RX ORDER — LORAZEPAM 2 MG/ML
1 INJECTION INTRAMUSCULAR ONCE
Status: COMPLETED | OUTPATIENT
Start: 2025-07-24 | End: 2025-07-24

## 2025-07-24 RX ORDER — THIAMINE HYDROCHLORIDE 100 MG/ML
100 INJECTION, SOLUTION INTRAMUSCULAR; INTRAVENOUS DAILY
Status: DISCONTINUED | OUTPATIENT
Start: 2025-07-24 | End: 2025-07-24

## 2025-07-24 RX ORDER — METHOCARBAMOL 500 MG/1
500 TABLET, FILM COATED ORAL 3 TIMES DAILY PRN
Status: ON HOLD | COMMUNITY
End: 2025-07-26

## 2025-07-24 RX ORDER — ESCITALOPRAM OXALATE 10 MG/1
10 TABLET ORAL
COMMUNITY

## 2025-07-24 RX ORDER — LEVETIRACETAM 1000 MG/1
1000 TABLET ORAL DAILY
Status: ON HOLD | COMMUNITY
End: 2025-07-26

## 2025-07-24 RX ORDER — LORAZEPAM 1 MG/1
2 TABLET ORAL EVERY 2 HOUR PRN
Status: DISCONTINUED | OUTPATIENT
Start: 2025-07-24 | End: 2025-07-26 | Stop reason: HOSPADM

## 2025-07-24 RX ORDER — LIDOCAINE 560 MG/1
1 PATCH PERCUTANEOUS; TOPICAL; TRANSDERMAL DAILY
Status: DISCONTINUED | OUTPATIENT
Start: 2025-07-24 | End: 2025-07-26 | Stop reason: HOSPADM

## 2025-07-24 RX ORDER — LORAZEPAM 2 MG/ML
1 INJECTION INTRAMUSCULAR EVERY 2 HOUR PRN
Status: DISCONTINUED | OUTPATIENT
Start: 2025-07-24 | End: 2025-07-24

## 2025-07-24 RX ORDER — LORAZEPAM 2 MG/ML
2 INJECTION INTRAMUSCULAR EVERY 2 HOUR PRN
Status: DISCONTINUED | OUTPATIENT
Start: 2025-07-24 | End: 2025-07-24

## 2025-07-24 RX ORDER — QUETIAPINE FUMARATE 25 MG/1
25 TABLET, FILM COATED ORAL NIGHTLY
Status: DISCONTINUED | OUTPATIENT
Start: 2025-07-24 | End: 2025-07-26 | Stop reason: HOSPADM

## 2025-07-24 RX ADMIN — ENOXAPARIN SODIUM 40 MG: 100 INJECTION SUBCUTANEOUS at 20:49

## 2025-07-24 RX ADMIN — GABAPENTIN 300 MG: 300 CAPSULE ORAL at 14:54

## 2025-07-24 RX ADMIN — ACETAMINOPHEN 650 MG: 325 TABLET ORAL at 20:49

## 2025-07-24 RX ADMIN — CEFTRIAXONE 2 G: 2 INJECTION, SOLUTION INTRAVENOUS at 03:35

## 2025-07-24 RX ADMIN — GABAPENTIN 300 MG: 300 CAPSULE ORAL at 08:04

## 2025-07-24 RX ADMIN — PHENOBARBITAL SODIUM 130 MG: 130 INJECTION INTRAMUSCULAR; INTRAVENOUS at 04:44

## 2025-07-24 RX ADMIN — LORAZEPAM 2 MG: 2 INJECTION, SOLUTION INTRAMUSCULAR; INTRAVENOUS at 03:35

## 2025-07-24 RX ADMIN — GABAPENTIN 300 MG: 300 CAPSULE ORAL at 20:49

## 2025-07-24 RX ADMIN — LEVOTHYROXINE SODIUM 125 MCG: 0.12 TABLET ORAL at 07:21

## 2025-07-24 RX ADMIN — SODIUM CHLORIDE, SODIUM LACTATE, POTASSIUM CHLORIDE, AND CALCIUM CHLORIDE 1000 ML: .6; .31; .03; .02 INJECTION, SOLUTION INTRAVENOUS at 04:32

## 2025-07-24 RX ADMIN — THIAMINE HYDROCHLORIDE 100 MG: 100 INJECTION, SOLUTION INTRAMUSCULAR; INTRAVENOUS at 08:04

## 2025-07-24 RX ADMIN — Medication 1 TABLET: at 08:04

## 2025-07-24 RX ADMIN — LIDOCAINE 1 PATCH: 4 PATCH TOPICAL at 08:04

## 2025-07-24 RX ADMIN — ESCITALOPRAM OXALATE 10 MG: 10 TABLET ORAL at 08:04

## 2025-07-24 RX ADMIN — QUETIAPINE FUMARATE 25 MG: 25 TABLET ORAL at 20:49

## 2025-07-24 RX ADMIN — PHENOBARBITAL 64.8 MG: 32.4 TABLET ORAL at 13:13

## 2025-07-24 RX ADMIN — HYDROMORPHONE HYDROCHLORIDE 0.2 MG: 1 INJECTION, SOLUTION INTRAMUSCULAR; INTRAVENOUS; SUBCUTANEOUS at 22:54

## 2025-07-24 RX ADMIN — PHENOBARBITAL 64.8 MG: 32.4 TABLET ORAL at 10:13

## 2025-07-24 RX ADMIN — PHENOBARBITAL 64.8 MG: 32.4 TABLET ORAL at 20:54

## 2025-07-24 RX ADMIN — PHENOBARBITAL 64.8 MG: 32.4 TABLET ORAL at 18:55

## 2025-07-24 RX ADMIN — HYDROMORPHONE HYDROCHLORIDE 0.2 MG: 1 INJECTION, SOLUTION INTRAMUSCULAR; INTRAVENOUS; SUBCUTANEOUS at 03:35

## 2025-07-24 RX ADMIN — LORAZEPAM 1 MG: 2 INJECTION, SOLUTION INTRAMUSCULAR; INTRAVENOUS at 07:40

## 2025-07-24 RX ADMIN — LORAZEPAM 0.5 MG: 2 INJECTION, SOLUTION INTRAMUSCULAR; INTRAVENOUS at 04:26

## 2025-07-24 RX ADMIN — AZITHROMYCIN 500 MG: 500 INJECTION, POWDER, LYOPHILIZED, FOR SOLUTION INTRAVENOUS at 04:14

## 2025-07-24 RX ADMIN — FOLIC ACID 1 MG: 1 TABLET ORAL at 08:03

## 2025-07-24 SDOH — HEALTH STABILITY: MENTAL HEALTH: HOW OFTEN DO YOU HAVE SIX OR MORE DRINKS ON ONE OCCASION?: NEVER

## 2025-07-24 SDOH — SOCIAL STABILITY: SOCIAL INSECURITY
WITHIN THE LAST YEAR, HAVE YOU BEEN RAPED OR FORCED TO HAVE ANY KIND OF SEXUAL ACTIVITY BY YOUR PARTNER OR EX-PARTNER?: NO

## 2025-07-24 SDOH — SOCIAL STABILITY: SOCIAL INSECURITY: WERE YOU ABLE TO COMPLETE ALL THE BEHAVIORAL HEALTH SCREENINGS?: YES

## 2025-07-24 SDOH — HEALTH STABILITY: MENTAL HEALTH: HOW OFTEN DO YOU HAVE A DRINK CONTAINING ALCOHOL?: 4 OR MORE TIMES A WEEK

## 2025-07-24 SDOH — SOCIAL STABILITY: SOCIAL INSECURITY: HAS ANYONE EVER THREATENED TO HURT YOUR FAMILY OR YOUR PETS?: NO

## 2025-07-24 SDOH — SOCIAL STABILITY: SOCIAL INSECURITY: WITHIN THE LAST YEAR, HAVE YOU BEEN HUMILIATED OR EMOTIONALLY ABUSED IN OTHER WAYS BY YOUR PARTNER OR EX-PARTNER?: NO

## 2025-07-24 SDOH — SOCIAL STABILITY: SOCIAL INSECURITY: HAVE YOU HAD THOUGHTS OF HARMING ANYONE ELSE?: NO

## 2025-07-24 SDOH — ECONOMIC STABILITY: FOOD INSECURITY: WITHIN THE PAST 12 MONTHS, YOU WORRIED THAT YOUR FOOD WOULD RUN OUT BEFORE YOU GOT THE MONEY TO BUY MORE.: NEVER TRUE

## 2025-07-24 SDOH — SOCIAL STABILITY: SOCIAL INSECURITY: DO YOU FEEL ANYONE HAS EXPLOITED OR TAKEN ADVANTAGE OF YOU FINANCIALLY OR OF YOUR PERSONAL PROPERTY?: NO

## 2025-07-24 SDOH — HEALTH STABILITY: MENTAL HEALTH: HOW MANY DRINKS CONTAINING ALCOHOL DO YOU HAVE ON A TYPICAL DAY WHEN YOU ARE DRINKING?: 3 OR 4

## 2025-07-24 SDOH — ECONOMIC STABILITY: INCOME INSECURITY: IN THE PAST 12 MONTHS HAS THE ELECTRIC, GAS, OIL, OR WATER COMPANY THREATENED TO SHUT OFF SERVICES IN YOUR HOME?: NO

## 2025-07-24 SDOH — SOCIAL STABILITY: SOCIAL INSECURITY: DOES ANYONE TRY TO KEEP YOU FROM HAVING/CONTACTING OTHER FRIENDS OR DOING THINGS OUTSIDE YOUR HOME?: NO

## 2025-07-24 SDOH — ECONOMIC STABILITY: FOOD INSECURITY: WITHIN THE PAST 12 MONTHS, THE FOOD YOU BOUGHT JUST DIDN'T LAST AND YOU DIDN'T HAVE MONEY TO GET MORE.: NEVER TRUE

## 2025-07-24 SDOH — SOCIAL STABILITY: SOCIAL INSECURITY: WITHIN THE LAST YEAR, HAVE YOU BEEN AFRAID OF YOUR PARTNER OR EX-PARTNER?: NO

## 2025-07-24 SDOH — SOCIAL STABILITY: SOCIAL INSECURITY
WITHIN THE LAST YEAR, HAVE YOU BEEN KICKED, HIT, SLAPPED, OR OTHERWISE PHYSICALLY HURT BY YOUR PARTNER OR EX-PARTNER?: NO

## 2025-07-24 SDOH — SOCIAL STABILITY: SOCIAL INSECURITY: ARE THERE ANY APPARENT SIGNS OF INJURIES/BEHAVIORS THAT COULD BE RELATED TO ABUSE/NEGLECT?: NO

## 2025-07-24 SDOH — SOCIAL STABILITY: SOCIAL INSECURITY: ARE YOU OR HAVE YOU BEEN THREATENED OR ABUSED PHYSICALLY, EMOTIONALLY, OR SEXUALLY BY ANYONE?: NO

## 2025-07-24 SDOH — SOCIAL STABILITY: SOCIAL INSECURITY: DO YOU FEEL UNSAFE GOING BACK TO THE PLACE WHERE YOU ARE LIVING?: NO

## 2025-07-24 SDOH — SOCIAL STABILITY: SOCIAL INSECURITY: ABUSE: ADULT

## 2025-07-24 ASSESSMENT — LIFESTYLE VARIABLES
TREMOR: MODERATE, WITH PATIENT'S ARMS EXTENDED
AUDITORY DISTURBANCES: NOT PRESENT
NAUSEA AND VOMITING: MILD NAUSEA WITH NO VOMITING
TOTAL SCORE: 10
TREMOR: 2
AUDITORY DISTURBANCES: NOT PRESENT
ANXIETY: MILDLY ANXIOUS
ANXIETY: NO ANXIETY, AT EASE
ANXIETY: MILDLY ANXIOUS
HEADACHE, FULLNESS IN HEAD: NOT PRESENT
PAROXYSMAL SWEATS: NO SWEAT VISIBLE
VISUAL DISTURBANCES: VERY MILD SENSITIVITY
PAROXYSMAL SWEATS: NO SWEAT VISIBLE
HEADACHE, FULLNESS IN HEAD: MODERATE
AUDITORY DISTURBANCES: NOT PRESENT
AGITATION: NORMAL ACTIVITY
AGITATION: NORMAL ACTIVITY
TACTILE DISTURBANCES: VERY MILD ITCHING, PINS AND NEEDLES, BURNING OR NUMBNESS
NAUSEA AND VOMITING: MILD NAUSEA WITH NO VOMITING
TREMOR: MODERATE, WITH PATIENT'S ARMS EXTENDED
VISUAL DISTURBANCES: VERY MILD SENSITIVITY
AGITATION: NORMAL ACTIVITY
BLOOD PRESSURE: 114/91
NAUSEA AND VOMITING: MILD NAUSEA WITH NO VOMITING
TREMOR: MODERATE, WITH PATIENT'S ARMS EXTENDED
PULSE: 119
TACTILE DISTURBANCES: VERY MILD ITCHING, PINS AND NEEDLES, BURNING OR NUMBNESS
AGITATION: NORMAL ACTIVITY
PAROXYSMAL SWEATS: NO SWEAT VISIBLE
TOTAL SCORE: 9
TREMOR: MODERATE, WITH PATIENT'S ARMS EXTENDED
TOTAL SCORE: 8
ANXIETY: MILDLY ANXIOUS
TREMOR: MODERATE, WITH PATIENT'S ARMS EXTENDED
TREMOR: 5
ORIENTATION AND CLOUDING OF SENSORIUM: ORIENTED AND CAN DO SERIAL ADDITIONS
ORIENTATION AND CLOUDING OF SENSORIUM: ORIENTED AND CAN DO SERIAL ADDITIONS
BLOOD PRESSURE: 116/65
HEADACHE, FULLNESS IN HEAD: MILD
TOTAL SCORE: 7
TACTILE DISTURBANCES: VERY MILD ITCHING, PINS AND NEEDLES, BURNING OR NUMBNESS
PAROXYSMAL SWEATS: NO SWEAT VISIBLE
ORIENTATION AND CLOUDING OF SENSORIUM: ORIENTED AND CAN DO SERIAL ADDITIONS
AUDIT-C TOTAL SCORE: 5
TOTAL SCORE: 11
NAUSEA AND VOMITING: MILD NAUSEA WITH NO VOMITING
AUDITORY DISTURBANCES: NOT PRESENT
AGITATION: NORMAL ACTIVITY
SKIP TO QUESTIONS 9-10: 0
PULSE: 128
ORIENTATION AND CLOUDING OF SENSORIUM: ORIENTED AND CAN DO SERIAL ADDITIONS
HEADACHE, FULLNESS IN HEAD: MODERATE
NAUSEA AND VOMITING: MILD NAUSEA WITH NO VOMITING
NAUSEA AND VOMITING: MILD NAUSEA WITH NO VOMITING
ANXIETY: MILDLY ANXIOUS
AGITATION: NORMAL ACTIVITY
VISUAL DISTURBANCES: VERY MILD SENSITIVITY
BLOOD PRESSURE: 124/97
NAUSEA AND VOMITING: MILD NAUSEA WITH NO VOMITING
VISUAL DISTURBANCES: NOT PRESENT
AUDITORY DISTURBANCES: NOT PRESENT
TACTILE DISTURBANCES: VERY MILD ITCHING, PINS AND NEEDLES, BURNING OR NUMBNESS
AGITATION: NORMAL ACTIVITY
AUDITORY DISTURBANCES: NOT PRESENT
HEADACHE, FULLNESS IN HEAD: MODERATE
ORIENTATION AND CLOUDING OF SENSORIUM: ORIENTED AND CAN DO SERIAL ADDITIONS
VISUAL DISTURBANCES: VERY MILD SENSITIVITY
TOTAL SCORE: 8
VISUAL DISTURBANCES: NOT PRESENT
PAROXYSMAL SWEATS: NO SWEAT VISIBLE
ORIENTATION AND CLOUDING OF SENSORIUM: ORIENTED AND CAN DO SERIAL ADDITIONS
PAROXYSMAL SWEATS: NO SWEAT VISIBLE
ANXIETY: NO ANXIETY, AT EASE
ANXIETY: MILDLY ANXIOUS
VISUAL DISTURBANCES: VERY MILD SENSITIVITY
AUDITORY DISTURBANCES: NOT PRESENT
HEADACHE, FULLNESS IN HEAD: NOT PRESENT
ORIENTATION AND CLOUDING OF SENSORIUM: ORIENTED AND CAN DO SERIAL ADDITIONS
HEADACHE, FULLNESS IN HEAD: NOT PRESENT
PAROXYSMAL SWEATS: NO SWEAT VISIBLE
TACTILE DISTURBANCES: VERY MILD ITCHING, PINS AND NEEDLES, BURNING OR NUMBNESS

## 2025-07-24 ASSESSMENT — COGNITIVE AND FUNCTIONAL STATUS - GENERAL
WALKING IN HOSPITAL ROOM: A LOT
CLIMB 3 TO 5 STEPS WITH RAILING: A LOT
PERSONAL GROOMING: A LITTLE
PERSONAL GROOMING: A LITTLE
PATIENT BASELINE BEDBOUND: NO
TOILETING: A LITTLE
DRESSING REGULAR UPPER BODY CLOTHING: A LITTLE
MOVING FROM LYING ON BACK TO SITTING ON SIDE OF FLAT BED WITH BEDRAILS: A LITTLE
HELP NEEDED FOR BATHING: A LITTLE
STANDING UP FROM CHAIR USING ARMS: A LITTLE
CLIMB 3 TO 5 STEPS WITH RAILING: A LITTLE
DAILY ACTIVITIY SCORE: 19
STANDING UP FROM CHAIR USING ARMS: A LITTLE
DRESSING REGULAR LOWER BODY CLOTHING: A LITTLE
DRESSING REGULAR UPPER BODY CLOTHING: A LITTLE
WALKING IN HOSPITAL ROOM: A LITTLE
DRESSING REGULAR LOWER BODY CLOTHING: A LITTLE
MOBILITY SCORE: 18
HELP NEEDED FOR BATHING: A LITTLE
DAILY ACTIVITIY SCORE: 19
TURNING FROM BACK TO SIDE WHILE IN FLAT BAD: A LITTLE
MOBILITY SCORE: 18
TOILETING: A LITTLE
MOVING TO AND FROM BED TO CHAIR: A LITTLE
MOVING TO AND FROM BED TO CHAIR: A LITTLE

## 2025-07-24 ASSESSMENT — PAIN SCALES - GENERAL
PAINLEVEL_OUTOF10: 7
PAINLEVEL_OUTOF10: 8
PAINLEVEL_OUTOF10: 7

## 2025-07-24 ASSESSMENT — ACTIVITIES OF DAILY LIVING (ADL)
BATHING: NEEDS ASSISTANCE
LACK_OF_TRANSPORTATION: YES
DRESSING YOURSELF: NEEDS ASSISTANCE
LACK_OF_TRANSPORTATION: NO
TOILETING: NEEDS ASSISTANCE
FEEDING YOURSELF: INDEPENDENT
HEARING - LEFT EAR: FUNCTIONAL
WALKS IN HOME: NEEDS ASSISTANCE
PATIENT'S MEMORY ADEQUATE TO SAFELY COMPLETE DAILY ACTIVITIES?: YES
GROOMING: NEEDS ASSISTANCE
ASSISTIVE_DEVICE: WALKER;SHOWER CHAIR
JUDGMENT_ADEQUATE_SAFELY_COMPLETE_DAILY_ACTIVITIES: YES
HEARING - RIGHT EAR: FUNCTIONAL
ADEQUATE_TO_COMPLETE_ADL: YES

## 2025-07-24 ASSESSMENT — PAIN - FUNCTIONAL ASSESSMENT
PAIN_FUNCTIONAL_ASSESSMENT: 0-10

## 2025-07-24 ASSESSMENT — PATIENT HEALTH QUESTIONNAIRE - PHQ9
2. FEELING DOWN, DEPRESSED OR HOPELESS: NOT AT ALL
SUM OF ALL RESPONSES TO PHQ9 QUESTIONS 1 & 2: 0
1. LITTLE INTEREST OR PLEASURE IN DOING THINGS: NOT AT ALL

## 2025-07-24 ASSESSMENT — PAIN DESCRIPTION - LOCATION
LOCATION: ABDOMEN
LOCATION: SHOULDER

## 2025-07-24 ASSESSMENT — PAIN DESCRIPTION - PROGRESSION: CLINICAL_PROGRESSION: NOT CHANGED

## 2025-07-24 NOTE — H&P
"    History Of Present Illness  Storm Barrios is a 40 y.o. male with PMHx including PSUD (tobacco, alcohol) c/b alcoholic pancreatitis, alcoholic hepatitis, hodgkins lymphoma s/p radiation c/b radiation induced chronic respiratory failure (on 2-3L NC), history of upper extremity DVT (on Eliquis in 2022 for 3 months), grade D esophagitis, hypothyroidism, depression presenting via EMS after fall.    Notes fall, described as mechanical fall after tripping on oxygen cord in setting of \"shakiness\" that started occurring after attempted to quit alcohol. Last drink was around 8 AM 7/23. Denies LOC, confusion after the fact, loss of bowel or bladder control, not on blood thinners    Regarding ETOH, states he has been a heavy drinker for years, since 20s. Drinks about 1 pint of vodka a day. Reports that his longest sobriety was about a year sober, currently has been drinking at this rate for 2 weeks. Has attended rehab in the past, currently interested. States that over the years, he has relapsed when he was bored, sad, stating divorce and his cancer diagnoses in his teens as triggers. Regarding withdrawal, states that he typically gets shakes and anxiety which is what he feels now, notes having seizures related to alcohol in the past although unable to tell me when.    ED Course:  7/23/25 2015 36.9 (98.4) 141 Abnormal  16 116/91 Abnormal  92 Abnormal  None (Room air)       Labs  Results from last 7 days   Lab Units 07/23/25 2122   SODIUM mmol/L 138   POTASSIUM mmol/L 3.7   CHLORIDE mmol/L 91*   CO2 mmol/L 32   BUN mg/dL 6   CREATININE mg/dL 0.84   GLUCOSE mg/dL 194*   CALCIUM mg/dL 8.9      Results from last 7 days   Lab Units 07/23/25 2122   WBC AUTO x10*3/uL 15.0*   HEMOGLOBIN g/dL 7.6*   HEMATOCRIT % 22.0*   PLATELETS AUTO x10*3/uL 548*     Results from last 7 days   Lab Units 07/23/25 2122   AST U/L 33   ALT U/L 7*   ALK PHOS U/L 669*   BILIRUBIN TOTAL mg/dL 0.5   PROTEIN TOTAL g/dL 8.2         "     Imaging:[email or web address], see below for reports    Interventions:    ED Medication Administration from 07/23/2025 2007 to 07/24/2025 0537         Date/Time Order Dose Route Action Action by     07/23/2025 2057 EDT folic acid (Folvite) tablet 1 mg 1 mg oral Given [facility]     07/23/2025 2057 EDT multivitamin with minerals 1 tablet 1 tablet oral Given [facility]     07/23/2025 2117 EDT thiamine (Vitamin B1) injection 100 mg 100 mg intravenous Given [facility]     07/23/2025 2118 EDT lactated Ringer's bolus 500 mL 500 mL intravenous [facility]     07/23/2025 2212 EDT PHENobarbital (Luminal) injection 637 mg 637 mg intravenous Not Given [facility]     07/23/2025 2252 EDT HYDROmorphone (Dilaudid) injection 0.2 mg 0.2 mg intravenous Given [facility]     07/23/2025 2319 EDT lactated Ringer's bolus 500 mL 0 mL intravenous Stopped [facility]     07/23/2025 2348 EDT iohexol (OMNIPaque) 350 mg iodine/mL solution 100 mL 80 mL intravenous Given [facility]     07/23/2025 2357 EDT magnesium sulfate 2 g in sterile water for injection 50 mL 2 g intravenous [facility]     07/23/2025 2358 EDT PHENobarbital (Luminal) 637 mg in sodium chloride 0.9% 100 mL  mg intravenous [facility]     07/24/2025 0038 EDT PHENobarbital (Luminal) 637 mg in sodium chloride 0.9% 100 mL IV 0 mg intravenous Stopped [facility]     07/24/2025 0208 EDT magnesium sulfate 2 g in sterile water for injection 50 mL 0 g intravenous Stopped [facility]     07/24/2025 0335 EDT cefTRIAXone (Rocephin) 2 g in dextrose (iso) IV 50 mL 2 g intravenous [facility]     07/24/2025 0335 EDT HYDROmorphone (Dilaudid) injection 0.2 mg 0.2 mg intravenous Given [facility]     07/24/2025 0335 EDT LORazepam (Ativan) injection 0.5 mg -- intravenous See Alternative [facility]     07/24/2025 0335 EDT LORazepam (Ativan) injection 1 mg -- intravenous See DONNIE Mcginnis     07/24/2025 0335 EDT LORazepam (Ativan) injection 2 mg 2 mg intravenous Given  DONNIE Stover     07/24/2025 0409 EDT cefTRIAXone (Rocephin) 2 g in dextrose (iso) IV 50 mL 0 g intravenous Stopped DONNIE Stover     07/24/2025 0414 EDT azithromycin (Zithromax) 500 mg in dextrose 5%  mL 500 mg intravenous [facility] Jolanta, DONNIE     07/24/2025 0426 EDT LORazepam (Ativan) injection 0.5 mg 0.5 mg intravenous Given DONNIE Stover     07/24/2025 0432 EDT lactated Ringer's bolus 1,000 mL 1,000 mL intravenous [facility] DONNIE Stover     07/24/2025 0444 EDT PHENobarbital (Luminal) injection 130 mg 130 mg intravenous Given DONNIE Stover     07/24/2025 0445 EDT PHENobarbital (Luminal) injection 130 mg -- intravenous Canceled Entry DONNIE Stover           10 point ROS performed and negative unless stated in HPI.     Past Medical History  He has no past medical history on file.    Surgical History  He has no past surgical history on file.     Social History  He has no history on file for tobacco use, alcohol use, and drug use.    Family History  Family History[1]     Allergies  Patient has no known allergies.     Physical Exam  Constitutional: ill appearing male in no acute distress, appears stated age  HEENT: NC/AT, sclera anicteric  Respiratory: CTAB. No wheezes, rales, or rhonchi. Normal respiratory effort.  Cardiovascular: tachycardic, No murmurs, gallops, or rubs  Abdominal: Soft, nontender, nondistended. Bowel sounds present  Neuro: AAOx4 , CN II-XII grossly intact. Tremor noted  MSK: No LE edema bilaterally, moving extremities well  Skin: Warm, dry. Well perfused  Psych: Appropriate mood and affect     Last Recorded Vitals  /85   Pulse (!) 129   Temp 36.9 °C (98.4 °F) (Oral)   Resp 15   Wt 63.5 kg (140 lb)   SpO2 100%     Medications    No current outpatient medications     Scheduled/Continuous Medications:  PRN Medications:    Scheduled Medications[2]    Continuous Medications[3] PRN Medications[4]           Labs  CBC RFP   Lab Results   Component Value Date    WBC 15.0 (H) 07/23/2025    HGB 7.6 (L)  "07/23/2025    HCT 22.0 (L) 07/23/2025    MCV 83 07/23/2025     (H) 07/23/2025    NEUTROABS 13.12 (H) 07/23/2025    Lab Results   Component Value Date     07/23/2025    K 3.7 07/23/2025    CL 91 (L) 07/23/2025    CO2 32 07/23/2025    BUN 6 07/23/2025    CREATININE 0.84 07/23/2025    CREATININE 0.59 10/17/2021     Lab Results   Component Value Date    MG 1.20 (L) 07/23/2025    PHOS 4.2 10/17/2021    CALCIUM 8.9 07/23/2025         Hepatic Function ABG/VBG   Lab Results   Component Value Date    ALT 7 (L) 07/23/2025    AST 33 07/23/2025    ALKPHOS 669 (H) 07/23/2025     No results found for: \"TBILIHCVFS\", \"BILIDIR\"   Lab Results   Component Value Date    PROTIME 13.8 (H) 10/08/2021    APTT 33 10/08/2021    INR 1.2 (H) 10/08/2021    No results found for: \"NONUHFIRE\", \"LACTATE\"     Micro/culture data:  No results found for the last 90 days.      -EKG:   Encounter Date: 07/23/25   ECG 12 lead   Result Value    Ventricular Rate 136    Atrial Rate 136    MN Interval 130    QRS Duration 74    QT Interval 286    QTC Calculation(Bazett) 430    P Axis 42    R Axis 51    T Axis 60    QRS Count 22    Q Onset 218    P Onset 153    P Offset 203    T Offset 361    QTC Fredericia 375    Narrative    Sinus tachycardia  Otherwise normal ECG  No previous ECGs available  See ED provider note for full interpretation and clinical correlation  Confirmed by Valentin Almazan ([id number]) on 7/24/2025 5:30:54 AM       Imaging  CT abdomen pelvis w IV contrast  Result Date: 7/24/2025  1.No significant change of 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 2.No significant change of small bilateral pleural effusions. 3.Interval increased patchy consolidative and ground-glass opacities in the left lower lobe. Since previous CT, concerning for aspiration/pneumonitis given the presents of reflux and " fluid-filled esophagus, which places the patient at increased risk for aspiration. 4.No acute findings in the abdomen and pelvis. 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 2.No significant change of small bilateral pleural effusions. 3.Interval increased patchy consolidative and ground-glass opacities in the left lower lobe. Since previous CT, concerning for aspiration/pneumonitis given the presents of reflux and fluid-filled esophagus, which places the patient at increased risk for aspiration. 4.No acute findings in the abdomen and pelvis. Signed by Baldev Luis MD    CT head wo IV contrast  Result Date: 7/24/2025  1. No acute intracranial abnormality. 2. No acute fracture or traumatic malalignment of the cervical spine.   I personally reviewed the images/study and I agree with the findings as stated by resident physician Ramakrishna Nix MD. This study was interpreted at [place], [place].   MACRO: None.   Signed by: Yaritza Melgoza 7/24/2025 12:50 AM Dictation workstation:   [id number]    CT cervical spine wo IV contrast  Result Date: 7/24/2025  1. No acute intracranial abnormality. 2. No acute fracture or traumatic malalignment of the cervical spine.   I personally reviewed the images/study and I agree with the findings as stated by resident physician Ramakrishna Nix MD. This study was interpreted at [place], [place].   MACRO: None.   Signed by: Yaritza Melgoza 7/24/2025 12:50 AM Dictation workstation:   [id number]      Cardiology, Vascular, and Other Imaging  ECG 12 lead  Result Date: 7/24/2025  Sinus tachycardia Otherwise normal ECG No previous ECGs available See ED  provider note for full interpretation and clinical correlation Confirmed by Valentin Almazan ([id number]) on 7/24/2025 5:30:54 AM       Assessment/Plan     Storm Barrios is a 40 y.o. male with PMHx including PSUD (tobacco, alcohol) c/b alcoholic pancreatitis, alcoholic hepatitis, hodgkins lymphoma s/p radiation c/b radiation induced chronic respiratory failure (on 3L NC), history of upper extremity DVT (on Eliquis in 2022 for 3 months), grade D esophagitis, hypothyroidism, depression presenting via EMS after mechanical fall, with CTH and C spine negative, now admitted for EtOH withdrawal, s/p phenobarb load in ED. CIWA 9>10 on admission, notes hx of seizures related to withdrawal. Will start phenobarb taper, pt is interested in quitting drinking, consider     #EtOH withdrawl  #AUD  -phenobarb taper and PRN per CIWA  -thiamine/folate/MVI  -fu Utox, ethanol level  -addiction med consult for consideration for inpatient vs outpatient rehab, also for phenobarb if to be discharged on it  -c/w home gabapentin 300mg TID  -home hydroxyzine prn    #Fall, mechanical  ::trauma richardson negative  -tyl 650mg q6hr prn  -lidocaine patch    #LLL PNA, likely aspiration  -CTX 1g q24hr  -azithromycin 500mg   -consider 5-7 day total course   -fu urine strep, legionella     #Grade D esophagitis  ::noted per 3/2025 EGD at [facility], pt is due for repeat EGD to assess healing  -c/w PPI BID  -refer back to GI upon discharge     #Hypothyroidism  -c/w levothyroxine    #Depression  -c/w lexapro  -c/w seroquel    #hodgkins lymphoma s/p radiation c/b radiation induced chronic respiratory failure (on 3L NC)  ::remote hx, chart diagnoses dating back to at least 2021, pt reports it was when he was a teen, potentially involved liver (noted resection per CT)  -seemingly in remission     F : prn  E: prn  N:regular   Access/Lines: piV  DVT prophylaxis: Lovenox sq/SCDs    O2: on RA  Abx: CTX and azithro  GI laxative: miralax prn    Code Status: Full code  (discussed with patient at bedside upon admission)   NOK: Keaton (child) [phone]        Diogenes Crews MD  Internal Medicine, PGY-3          [1] No family history on file.  [2] azithromycin, 500 mg, intravenous, q24h  cefTRIAXone, 2 g, intravenous, q24h  folic acid, 1 mg, oral, Daily  multivitamin with minerals, 1 tablet, oral, Daily  [START ON 7/26/2025] thiamine, 100 mg, oral, Daily  thiamine, 100 mg, intravenous, Daily  [3]    [4] PRN medications: LORazepam **OR** LORazepam **OR** LORazepam

## 2025-07-24 NOTE — PROGRESS NOTES
07/24/25 1546   Discharge Planning   Living Arrangements Alone   Support Systems Family members;Friends/neighbors   Assistance Needed None   Type of Residence Private residence   Number of Stairs to Enter Residence 12   Number of Stairs Within Residence 0   Do you have animals or pets at home? No   Who is requesting discharge planning? Provider   Home or Post Acute Services None   Expected Discharge Disposition Home   Does the patient need discharge transport arranged? No  (Pt can use provide a ride/uber/lyft)   Financial Resource Strain   How hard is it for you to pay for the very basics like food, housing, medical care, and heating? Not very   Housing Stability   In the last 12 months, was there a time when you were not able to pay the mortgage or rent on time? N   At any time in the past 12 months, were you homeless or living in a shelter (including now)? N   Transportation Needs   In the past 12 months, has lack of transportation kept you from medical appointments or from getting medications? no   In the past 12 months, has lack of transportation kept you from meetings, work, or from getting things needed for daily living? No   Patient Choice   Provider Choice list and CMS website (https://medicare.gov/care-compare#search) for post-acute Quality and Resource Measure Data were provided and reviewed with: Patient     Met with pt introduced myself as  and member of the Care Transitions team for discharge planning.  Pt feels safe at home. Pt stated was independent prior to admission.  Pt drives to [address].  Pt's address, phone number and contact information was verified. Pt does not have any other questions/concerns at this time.      Previous Home Care:  N/A  DME:  rollator shower chair o2  PCP: [facility]   Pharmacy:  [facility]   MEDICATIONS AFFORDABLE:  Yes  DIABETIC/SUPPLIES NEEDED:  Yes 9no supplies needed  Oxygen/Bipap/Cpap: 2L concentrator   Dialysis: 0  Falls:  1  Transport  home:  Medical/Public transport  Potential Barriers: none   Discharge Disposition: home   ADOD:   1 day    Social Work Note    Pt is independent with daily needs. Pt is expected to discharge home with no new needs pending more recs. Pt drive utilizes public/medical transportation. Care transitions available to assist with any future discharge needs.     [facility] referral made      JENNYFER Childers

## 2025-07-24 NOTE — PROGRESS NOTES
Emergency Department Transition of Care Note       Signout   I received Storm Barrios in signout from Dr. Zambrano.  Please see the ED Provider Note for all HPI, PE and MDM up to the time of signout at 11 pm.  This is in addition to the primary record.    Storm Barrios is a 40 y.o. male Past medical history of alcohol use disorder with withdrawal and pancreatitis, ADHD, history of DVT history of Hodgkin's lymphoma, hypothyroidism, depression, nicotine use disorder presenting via EMS after fall.     At the time of signout we were awaiting:  CT head, C-spine, CT PE and abdomen pelvis.    ED Course & Medical Decision Making   Medical Decision Making:  Under my care, patient was tachycardic between 130 and 140.  CIWA was initially assessed on my arrival to be 8.  Prior to my arrival, patient got phenobarbital 10 mg/kg loading dose.  Patient was complaining of headache along with some pain and 0.2 mg of Dilaudid was given.  Patient continued to be tachycardic.  CIWA was approximately 10 therefore Ativan IV as needed per alcohol withdrawal protocol was ordered.  Patient got 2 mg of Ativan initially but continued to have tachycardia and an additional 0.5 mg was given.  A liter of LR was then given that the patient did appear to be dry on exam.  EKG prior to my arrival showed no ischemic changes.  He had a repeat VBG that showed a pH of 7.47, CO2 of 49, bicarb of 35.7 with a lactate of 1.4 which does show initially some improvement from the previous VBG    CTA chest and CT abdomen and pelvis shows  IMPRESSION:  1.No significant change of previously described dissection of the  innominate artery extending into the right common carotid artery with  focal high-grade stenosis at the origin of the right common carotid  artery.  2.No significant change of small bilateral pleural effusions.  3.Interval increased patchy consolidative and ground-glass  opacities in the left lower lobe. Since previous CT, concerning  for  aspiration/pneumonitis given the presents of reflux and fluid-filled  esophagus, which places the patient at increased risk for aspiration.  4.No acute findings in the abdomen and pelvis.    CT H and C spine   1. No acute intracranial abnormality.  2. No acute fracture or traumatic malalignment of the cervical spine.    Given the new leukocytosis along with the concern for a left lower lobe opacity, patient was started on ceftriaxone and azithromycin for concern of community acquired pneumonia.  Differential diagnosis also includes aspiration and pneumonitis.  Patient has been admitted to the general medicine service for alcohol withdrawal and concerns of pneumonia.    ED Course:  ED Course as of 07/24/25 1003   Wed Jul 23, 2025   2318 MAGNESIUM(!): 1.20  Repletion ordered [AW]      ED Course User Index  [AW] Lynn Zambrano DO         Diagnoses as of 07/24/25 1003   Alcohol withdrawal syndrome without complication (Multi)   Hypomagnesemia   Pneumonia of left lower lobe due to infectious organism       Disposition   As a result of their workup, the patient will require admission to the hospital.  The patient was informed of his diagnosis.  The patient was given the opportunity to ask questions and I answered them. The patient agreed to be admitted to the hospital.    Procedures   Procedures    Patient seen and discussed with ED attending physician.    Clare Brewer MD  Emergency Medicine

## 2025-07-24 NOTE — ED PROVIDER NOTES
"  Emergency Department Provider Note       History of Present Illness     History provided by: Patient  Limitations to History: None  External Records Reviewed with Brief Summary: None    HPI:  Storm Barrios is a 40 y.o. male Past medical history of alcohol use disorder with withdrawal and pancreatitis, ADHD, history of DVT history of Hodgkin's lymphoma, hypothyroidism, depression, nicotine use disorder presenting via EMS after fall.  Patient states this happened this morning.  He believes that he tripped over his oxygen cord and did hit his head.  He is unsure if he lost consciousness.  Patient states that he feels as though he is going through alcohol withdrawal.  He has a history of withdrawal and believes he may have had seizures as he is on Keppra.  Patient states last drink was this morning and typically drinks a pint of vodka a day.  Patient endorses \"the shakes\" anxiety and abdominal pain.  Patient is unaware of his history of DVT and is not on Eliquis.  Per chart review patient's Hodgkin's lymphoma is in remission.    Physical Exam   Triage vitals:  T 36.9 °C (98.4 °F)  HR (!) 141  BP (!) 116/91  RR 16  O2 (!) 92 % None (Room air)    General: Awake, alert, thin appearing   Eyes: Gaze conjugate.  No scleral icterus or injection  HENT: Normo-cephalic, atraumatic. No stridor  CV: Tachycardic rate, regular rhythm. Radial pulses 2+ bilaterally  Resp: Breathing non-labored, speaking in full sentences.  Clear to auscultation bilaterally  GI: Soft, distended in the RUQ with tenderness to palpation. No rebound or guarding.  MSK/Extremities: No gross bony deformities. Moving all extremities  Skin: Warm. Appropriate color. No diaphoresis.   Neuro: Alert. Oriented x3. Face symmetric. Tremor noted at rest. Speech is fluent.  Gross strength and sensation intact in b/l UE and LEs  Psych: Anxious and tearful      Medical Decision Making & ED Course   Medical Decision Makin y.o. male past medical history " states above presents with a chief complaint of a fall and concern for alcohol withdrawal.  On arrival to the emergency department patient is tachycardic at 140, maintaining his blood pressure afebrile Satcher 92% on his home O2.  On physical exam patient is very thin appearing, tremulous anxious and tearful.  Differential is broad for this patient including subdural/subarachnoid from his fall with head strike, alcohol withdrawal, pulmonary embolism, intra-abdominal injury secondary to his fall.  Initial CIWA on my exam is greater than 10.  Started on his phenobarbital loading dose, thiamine folate.  Will obtain CBC CMP VBG urinalysis and hepatitis panel given severe tenderness to the right upper quadrant with palpable liver edge.  Will obtain a CT head, c-spine, abdomen pelvis and angio for PE.  Anticipate admission.  ----      Differential diagnoses considered include but are not limited to: As stated above    Social Determinants of Health which Significantly Impact Care: Social Determinants of Health which Significantly Impact Care: Substance use disorder The following actions were taken to address these social determinants : Patient offered evaluation by [facility] and Patient offered evaluation by Social Work    EKG Independent Interpretation: EKG interpreted by myself. Please see ED Course for full interpretation.    Independent Result Review and Interpretation: Relevant laboratory and radiographic results were reviewed and independently interpreted by myself.  As necessary, they are commented on in the ED Course.    Chronic conditions affecting the patient's care: As documented above in [facility]    The patient was discussed with the following consultants/services: None    Care Considerations: As documented above in [facility]    ED Course:  ED Course as of 07/23/25 2318 Wed Jul 23, 2025 2318 MAGNESIUM(!): 1.20  Repletion ordered [AW]      ED Course User Index  [AW] Lynn Zambrano DO         Diagnoses as of 07/23/25  6346   Alcohol withdrawal syndrome without complication (Multi)   Hypomagnesemia       Disposition   Patient was signed out to Dr. Brewer at 2300 pending completion of their work-up.  Please see the next provider's transition of care note for the remainder of the patient's care.     Procedures   Procedures    Patient seen and discussed with ED attending physician.    Lynn Zambrano DO  Emergency Medicine                                                       Lynn Zambrano DO  Resident  07/23/25 3797

## 2025-07-24 NOTE — NURSING NOTE
Patient admitted to [place] [id number] from the ER  today. He was oriented to the environment and placed on the telemetry monitor per MD order. His admission screenings were completed. He lives at home alone and anticipates returning home at discharge time. He is unsteady on his feet at this time so his bed alarm is on and he is x1 assist to the bathroom.

## 2025-07-24 NOTE — PROGRESS NOTES
Pharmacy Medication History Review    Storm Barrios is a 40 y.o. male admitted for Alcohol withdrawal syndrome without complication (Multi). Pharmacy reviewed the patient's [id number] medications and allergies for accuracy.    Medications ADDED:  Acetaminophen 500 mg  Cetirizine 10 mg  Magnesium oxide 400 mg  Methocarbamol 500 mg  Vitamin B1 100 mg  Tramadol 50 mg   Keppra 1000 mg  Medications CHANGED:  Gabapentin from three times a day to twice a day  Seroquel from one a day to twice a day  Medications REMOVED:   none     The list below reflects the updated PTA list.   Prior to Admission Medications   Prescriptions Last Dose Informant   QUEtiapine (SEROquel) 25 mg tablet  Self   Sig: Take 1 tablet (25 mg) by mouth once daily at bedtime.   Patient taking differently: Take 1 tablet (25 mg) by mouth 2 times a day.   acetaminophen (Tylenol) 500 mg tablet Past Week Self   Sig: Take 2 tablets (1,000 mg) by mouth every 6 hours if needed for mild pain (1 - 3).   cetirizine (ZyrTEC) 10 mg tablet 7/22/2025 Self   Sig: Take 1 tablet (10 mg) by mouth once daily.   escitalopram (Lexapro) 10 mg tablet 7/23/2025 Self   Sig: Take 1 tablet (10 mg) by mouth once daily.   gabapentin (Neurontin) 300 mg capsule 7/23/2025 Self   Sig: Take 1 capsule (300 mg) by mouth 3 times a day.   Patient taking differently: Take 1 capsule (300 mg) by mouth 2 times a day.   hydrOXYzine HCL (Atarax) 25 mg tablet Past Week Self   Sig: Take 1 tablet (25 mg) by mouth every 6 hours if needed for itching or anxiety.   levETIRAcetam (Keppra) 1,000 mg tablet 7/22/2025 Self   Sig: Take 1 tablet (1,000 mg) by mouth once daily.  On 7/24/25 patient reports taking 1 tablet daily, but last pharmacy fill was on 4/30/25 for #60 tablets / 30 day supply to take 1 tablet twice a day   levothyroxine (Synthroid, Levoxyl) 125 mcg tablet 7/23/2025 Self   Sig: Take 1 tablet (125 mcg) by mouth once daily.   magnesium oxide (Mag-Ox) 400 mg tablet 7/23/2025 Self  "  Sig: Take 1 tablet (400 mg) by mouth 2 times a day.   methocarbamol (Robaxin) 500 mg tablet 7/21/2025 Self   Sig: Take 1 tablet (500 mg) by mouth 3 times a day as needed for muscle spasms.   thiamine 100 mg tablet 7/23/2025 Self   Sig: Take 1 tablet (100 mg) by mouth once daily.   traMADol (Ultram) 50 mg tablet 7/23/2025 Self   Sig: Take 1 tablet (50 mg) by mouth every 6 hours if needed for severe pain (7 - 10).      Facility-Administered Medications: None          The list below reflects the updated allergy list. Please review each documented allergy for additional clarification and justification.  Allergies  Reviewed by Jonathon Garnica on 7/24/2025   No Known Allergies         Patient accepts M2B at discharge.     Sources:   [facility]  Pharmacy dispense history  Patient Interview Good historian  Chart Review       Additional Comments:  Patient reports taking gabapentin twice daily. Last fill 4/30/25 , 270 capsules for 30-day supply.  Patient reports taking Seroquel twice a day. Last fill 7/16/25, 30 tablets for 30-day supply.  Patient reports taking magnesium oxide twice a day; last fill 6/6/25, 30 tablets for 30-day supply.   Patient reports taking thiamine once daily; last fill 4/30/25, 90 tablets for 30-day supply.   Patient reports taking Keppra once daily; last fill 4/30/25, 60 tablets for 30-day supply.       JONATHON GARNICA  Pharmacy Technician  07/24/25     Secure Chat preferred   If no response call [phone] or [employer] \"Med Rec\"    "

## 2025-07-24 NOTE — CARE PLAN
The patient's goals for the shift include      The clinical goals for the shift include Patient will be free from injury and falls

## 2025-07-24 NOTE — ED TRIAGE NOTES
"Pt BIB [facility] due to fall from standing at home earlier this morning. Pt -Positive headstrike, Pt denies LOC & thinners. Pt states he tripped on oxygen cord and is also \"shaky\" due to trying to quit drinking alcohol. Pt states last drink was this morning. Patient is alert and mildly confused on arrival.   "

## 2025-07-25 ENCOUNTER — APPOINTMENT (OUTPATIENT)
Dept: CARDIOLOGY | Facility: HOSPITAL | Age: 40
End: 2025-07-25
Payer: COMMERCIAL

## 2025-07-25 LAB
ALBUMIN SERPL BCP-MCNC: 3.2 G/DL (ref 3.4–5)
ANION GAP SERPL CALC-SCNC: 12 MMOL/L (ref 10–20)
BASOPHILS # BLD AUTO: 0.07 X10*3/UL (ref 0–0.1)
BASOPHILS NFR BLD AUTO: 0.7 %
BUN SERPL-MCNC: 4 MG/DL (ref 6–23)
CALCIUM SERPL-MCNC: 8.3 MG/DL (ref 8.6–10.6)
CHLORIDE SERPL-SCNC: 95 MMOL/L (ref 98–107)
CO2 SERPL-SCNC: 32 MMOL/L (ref 21–32)
CREAT SERPL-MCNC: 0.84 MG/DL (ref 0.5–1.3)
EGFRCR SERPLBLD CKD-EPI 2021: >90 ML/MIN/1.73M*2
EOSINOPHIL # BLD AUTO: 0.02 X10*3/UL (ref 0–0.7)
EOSINOPHIL NFR BLD AUTO: 0.2 %
ERYTHROCYTE [DISTWIDTH] IN BLOOD BY AUTOMATED COUNT: 17.2 % (ref 11.5–14.5)
GLUCOSE SERPL-MCNC: 122 MG/DL (ref 74–99)
HCT VFR BLD AUTO: 23.8 % (ref 41–52)
HGB BLD-MCNC: 7.7 G/DL (ref 13.5–17.5)
IMM GRANULOCYTES # BLD AUTO: 0.05 X10*3/UL (ref 0–0.7)
IMM GRANULOCYTES NFR BLD AUTO: 0.5 % (ref 0–0.9)
LYMPHOCYTES # BLD AUTO: 0.55 X10*3/UL (ref 1.2–4.8)
LYMPHOCYTES NFR BLD AUTO: 5.7 %
MAGNESIUM SERPL-MCNC: 1.3 MG/DL (ref 1.6–2.4)
MCH RBC QN AUTO: 28.7 PG (ref 26–34)
MCHC RBC AUTO-ENTMCNC: 32.4 G/DL (ref 32–36)
MCV RBC AUTO: 89 FL (ref 80–100)
MONOCYTES # BLD AUTO: 0.6 X10*3/UL (ref 0.1–1)
MONOCYTES NFR BLD AUTO: 6.2 %
NEUTROPHILS # BLD AUTO: 8.32 X10*3/UL (ref 1.2–7.7)
NEUTROPHILS NFR BLD AUTO: 86.7 %
NRBC BLD-RTO: 0 /100 WBCS (ref 0–0)
PHOSPHATE SERPL-MCNC: 1.4 MG/DL (ref 2.5–4.9)
PLATELET # BLD AUTO: 270 X10*3/UL (ref 150–450)
POTASSIUM SERPL-SCNC: 3.4 MMOL/L (ref 3.5–5.3)
RBC # BLD AUTO: 2.68 X10*6/UL (ref 4.5–5.9)
SODIUM SERPL-SCNC: 136 MMOL/L (ref 136–145)
STAPHYLOCOCCUS SPEC CULT: NORMAL
TSH SERPL-ACNC: 29.28 MIU/L (ref 0.44–3.98)
WBC # BLD AUTO: 9.6 X10*3/UL (ref 4.4–11.3)

## 2025-07-25 PROCEDURE — 2500000004 HC RX 250 GENERAL PHARMACY W/ HCPCS (ALT 636 FOR OP/ED): Mod: SE

## 2025-07-25 PROCEDURE — 1200000002 HC GENERAL ROOM WITH TELEMETRY DAILY

## 2025-07-25 PROCEDURE — 99233 SBSQ HOSP IP/OBS HIGH 50: CPT | Performed by: STUDENT IN AN ORGANIZED HEALTH CARE EDUCATION/TRAINING PROGRAM

## 2025-07-25 PROCEDURE — 85025 COMPLETE CBC W/AUTO DIFF WBC: CPT | Performed by: STUDENT IN AN ORGANIZED HEALTH CARE EDUCATION/TRAINING PROGRAM

## 2025-07-25 PROCEDURE — 97161 PT EVAL LOW COMPLEX 20 MIN: CPT | Mod: GP

## 2025-07-25 PROCEDURE — 2500000005 HC RX 250 GENERAL PHARMACY W/O HCPCS: Mod: SE

## 2025-07-25 PROCEDURE — 80069 RENAL FUNCTION PANEL: CPT | Performed by: STUDENT IN AN ORGANIZED HEALTH CARE EDUCATION/TRAINING PROGRAM

## 2025-07-25 PROCEDURE — 99254 IP/OBS CNSLTJ NEW/EST MOD 60: CPT | Performed by: EMERGENCY MEDICINE

## 2025-07-25 PROCEDURE — 2500000004 HC RX 250 GENERAL PHARMACY W/ HCPCS (ALT 636 FOR OP/ED): Mod: JW,SE | Performed by: STUDENT IN AN ORGANIZED HEALTH CARE EDUCATION/TRAINING PROGRAM

## 2025-07-25 PROCEDURE — 2500000002 HC RX 250 W HCPCS SELF ADMINISTERED DRUGS (ALT 637 FOR MEDICARE OP, ALT 636 FOR OP/ED): Mod: SE | Performed by: STUDENT IN AN ORGANIZED HEALTH CARE EDUCATION/TRAINING PROGRAM

## 2025-07-25 PROCEDURE — 84443 ASSAY THYROID STIM HORMONE: CPT | Performed by: STUDENT IN AN ORGANIZED HEALTH CARE EDUCATION/TRAINING PROGRAM

## 2025-07-25 PROCEDURE — 2500000001 HC RX 250 WO HCPCS SELF ADMINISTERED DRUGS (ALT 637 FOR MEDICARE OP): Mod: SE | Performed by: STUDENT IN AN ORGANIZED HEALTH CARE EDUCATION/TRAINING PROGRAM

## 2025-07-25 PROCEDURE — 2500000001 HC RX 250 WO HCPCS SELF ADMINISTERED DRUGS (ALT 637 FOR MEDICARE OP): Mod: SE

## 2025-07-25 PROCEDURE — 93005 ELECTROCARDIOGRAM TRACING: CPT

## 2025-07-25 PROCEDURE — 83735 ASSAY OF MAGNESIUM: CPT | Performed by: STUDENT IN AN ORGANIZED HEALTH CARE EDUCATION/TRAINING PROGRAM

## 2025-07-25 PROCEDURE — 2500000001 HC RX 250 WO HCPCS SELF ADMINISTERED DRUGS (ALT 637 FOR MEDICARE OP): Mod: SE | Performed by: EMERGENCY MEDICINE

## 2025-07-25 PROCEDURE — 36415 COLL VENOUS BLD VENIPUNCTURE: CPT | Performed by: STUDENT IN AN ORGANIZED HEALTH CARE EDUCATION/TRAINING PROGRAM

## 2025-07-25 PROCEDURE — 2500000005 HC RX 250 GENERAL PHARMACY W/O HCPCS: Mod: SE | Performed by: STUDENT IN AN ORGANIZED HEALTH CARE EDUCATION/TRAINING PROGRAM

## 2025-07-25 PROCEDURE — 2500000002 HC RX 250 W HCPCS SELF ADMINISTERED DRUGS (ALT 637 FOR MEDICARE OP, ALT 636 FOR OP/ED): Mod: SE

## 2025-07-25 RX ORDER — PHENOBARBITAL 32.4 MG/1
64.8 TABLET ORAL ONCE AS NEEDED
Status: DISCONTINUED | OUTPATIENT
Start: 2025-07-25 | End: 2025-07-26 | Stop reason: HOSPADM

## 2025-07-25 RX ORDER — MAGNESIUM SULFATE HEPTAHYDRATE 40 MG/ML
2 INJECTION, SOLUTION INTRAVENOUS ONCE
Status: COMPLETED | OUTPATIENT
Start: 2025-07-25 | End: 2025-07-25

## 2025-07-25 RX ORDER — ACETAMINOPHEN 325 MG/1
650 TABLET ORAL EVERY 6 HOURS PRN
Status: DISCONTINUED | OUTPATIENT
Start: 2025-07-25 | End: 2025-07-26 | Stop reason: HOSPADM

## 2025-07-25 RX ORDER — LEVETIRACETAM 500 MG/1
500 TABLET ORAL 2 TIMES DAILY
Status: DISCONTINUED | OUTPATIENT
Start: 2025-07-25 | End: 2025-07-26 | Stop reason: HOSPADM

## 2025-07-25 RX ORDER — ACAMPROSATE CALCIUM 333 MG/1
666 TABLET, DELAYED RELEASE ORAL 3 TIMES DAILY
Status: DISCONTINUED | OUTPATIENT
Start: 2025-07-25 | End: 2025-07-26 | Stop reason: HOSPADM

## 2025-07-25 RX ORDER — NALTREXONE HYDROCHLORIDE 50 MG/1
50 TABLET, FILM COATED ORAL DAILY
Status: DISCONTINUED | OUTPATIENT
Start: 2025-07-25 | End: 2025-07-25

## 2025-07-25 RX ORDER — PHENOBARBITAL 32.4 MG/1
64.8 TABLET ORAL ONCE
Status: COMPLETED | OUTPATIENT
Start: 2025-07-25 | End: 2025-07-25

## 2025-07-25 RX ORDER — KETOROLAC TROMETHAMINE 30 MG/ML
15 INJECTION, SOLUTION INTRAMUSCULAR; INTRAVENOUS EVERY 8 HOURS PRN
Status: DISCONTINUED | OUTPATIENT
Start: 2025-07-25 | End: 2025-07-26 | Stop reason: HOSPADM

## 2025-07-25 RX ORDER — KETOROLAC TROMETHAMINE 30 MG/ML
15 INJECTION, SOLUTION INTRAMUSCULAR; INTRAVENOUS ONCE
Status: COMPLETED | OUTPATIENT
Start: 2025-07-25 | End: 2025-07-25

## 2025-07-25 RX ORDER — POTASSIUM CHLORIDE 20 MEQ/1
40 TABLET, EXTENDED RELEASE ORAL 2 TIMES DAILY
Status: COMPLETED | OUTPATIENT
Start: 2025-07-25 | End: 2025-07-25

## 2025-07-25 RX ORDER — LANOLIN ALCOHOL/MO/W.PET/CERES
100 CREAM (GRAM) TOPICAL DAILY
Status: DISCONTINUED | OUTPATIENT
Start: 2025-07-26 | End: 2025-07-26 | Stop reason: HOSPADM

## 2025-07-25 RX ORDER — MIRTAZAPINE 15 MG/1
15 TABLET, FILM COATED ORAL NIGHTLY
Status: DISCONTINUED | OUTPATIENT
Start: 2025-07-25 | End: 2025-07-26 | Stop reason: HOSPADM

## 2025-07-25 RX ORDER — TAMSULOSIN HYDROCHLORIDE 0.4 MG/1
0.4 CAPSULE ORAL DAILY
Status: DISCONTINUED | OUTPATIENT
Start: 2025-07-25 | End: 2025-07-26 | Stop reason: HOSPADM

## 2025-07-25 RX ADMIN — PHENOBARBITAL 64.8 MG: 32.4 TABLET ORAL at 15:25

## 2025-07-25 RX ADMIN — Medication 1 TABLET: at 10:02

## 2025-07-25 RX ADMIN — LIDOCAINE 1 PATCH: 4 PATCH TOPICAL at 10:02

## 2025-07-25 RX ADMIN — SODIUM CHLORIDE, SODIUM LACTATE, POTASSIUM CHLORIDE, AND CALCIUM CHLORIDE 1000 ML: .6; .31; .03; .02 INJECTION, SOLUTION INTRAVENOUS at 17:27

## 2025-07-25 RX ADMIN — HYDROXYZINE HYDROCHLORIDE 25 MG: 25 TABLET, FILM COATED ORAL at 11:52

## 2025-07-25 RX ADMIN — LEVOTHYROXINE SODIUM 125 MCG: 0.12 TABLET ORAL at 06:13

## 2025-07-25 RX ADMIN — KETOROLAC TROMETHAMINE 15 MG: 30 INJECTION, SOLUTION INTRAMUSCULAR; INTRAVENOUS at 23:43

## 2025-07-25 RX ADMIN — LORAZEPAM 1 MG: 1 TABLET ORAL at 21:19

## 2025-07-25 RX ADMIN — SODIUM CHLORIDE, SODIUM LACTATE, POTASSIUM CHLORIDE, AND CALCIUM CHLORIDE 1000 ML: .6; .31; .03; .02 INJECTION, SOLUTION INTRAVENOUS at 13:13

## 2025-07-25 RX ADMIN — ESCITALOPRAM OXALATE 10 MG: 10 TABLET ORAL at 10:01

## 2025-07-25 RX ADMIN — KETOROLAC TROMETHAMINE 15 MG: 30 INJECTION, SOLUTION INTRAMUSCULAR; INTRAVENOUS at 15:17

## 2025-07-25 RX ADMIN — POTASSIUM PHOSPHATE 15 MMOL: 236; 224 INJECTION, SOLUTION INTRAVENOUS at 15:26

## 2025-07-25 RX ADMIN — PHENOBARBITAL 64.8 MG: 32.4 TABLET ORAL at 12:06

## 2025-07-25 RX ADMIN — GABAPENTIN 300 MG: 300 CAPSULE ORAL at 20:55

## 2025-07-25 RX ADMIN — AZITHROMYCIN 500 MG: 500 INJECTION, POWDER, LYOPHILIZED, FOR SOLUTION INTRAVENOUS at 03:02

## 2025-07-25 RX ADMIN — ACETAMINOPHEN 650 MG: 325 TABLET ORAL at 12:10

## 2025-07-25 RX ADMIN — POTASSIUM CHLORIDE 40 MEQ: 1500 TABLET, EXTENDED RELEASE ORAL at 20:55

## 2025-07-25 RX ADMIN — MAGNESIUM SULFATE HEPTAHYDRATE 2 G: 40 INJECTION, SOLUTION INTRAVENOUS at 11:52

## 2025-07-25 RX ADMIN — ACAMPROSATE CALCIUM 666 MG: 333 TABLET, DELAYED RELEASE ORAL at 20:54

## 2025-07-25 RX ADMIN — THIAMINE HYDROCHLORIDE 100 MG: 100 INJECTION, SOLUTION INTRAMUSCULAR; INTRAVENOUS at 10:00

## 2025-07-25 RX ADMIN — PHENOBARBITAL 64.8 MG: 32.4 TABLET ORAL at 20:54

## 2025-07-25 RX ADMIN — MIRTAZAPINE 15 MG: 15 TABLET, FILM COATED ORAL at 20:55

## 2025-07-25 RX ADMIN — LEVETIRACETAM 500 MG: 500 TABLET, FILM COATED ORAL at 20:55

## 2025-07-25 RX ADMIN — ENOXAPARIN SODIUM 40 MG: 100 INJECTION SUBCUTANEOUS at 20:55

## 2025-07-25 RX ADMIN — GABAPENTIN 300 MG: 300 CAPSULE ORAL at 17:25

## 2025-07-25 RX ADMIN — TAMSULOSIN HYDROCHLORIDE 0.4 MG: 0.4 CAPSULE ORAL at 17:25

## 2025-07-25 RX ADMIN — GABAPENTIN 300 MG: 300 CAPSULE ORAL at 10:01

## 2025-07-25 RX ADMIN — PHENOBARBITAL 64.8 MG: 32.4 TABLET ORAL at 01:12

## 2025-07-25 RX ADMIN — FOLIC ACID 1 MG: 1 TABLET ORAL at 10:01

## 2025-07-25 RX ADMIN — QUETIAPINE FUMARATE 25 MG: 25 TABLET ORAL at 20:55

## 2025-07-25 RX ADMIN — CEFTRIAXONE 2 G: 2 INJECTION, SOLUTION INTRAVENOUS at 02:09

## 2025-07-25 RX ADMIN — POTASSIUM CHLORIDE 40 MEQ: 1500 TABLET, EXTENDED RELEASE ORAL at 11:51

## 2025-07-25 RX ADMIN — LEVETIRACETAM 500 MG: 500 TABLET, FILM COATED ORAL at 10:01

## 2025-07-25 RX ADMIN — HYDROXYZINE HYDROCHLORIDE 25 MG: 25 TABLET, FILM COATED ORAL at 02:09

## 2025-07-25 ASSESSMENT — PAIN SCALES - GENERAL
PAINLEVEL_OUTOF10: 5 - MODERATE PAIN
PAINLEVEL_OUTOF10: 5 - MODERATE PAIN
PAINLEVEL_OUTOF10: 7
PAINLEVEL_OUTOF10: 6
PAINLEVEL_OUTOF10: 0 - NO PAIN
PAINLEVEL_OUTOF10: 8
PAINLEVEL_OUTOF10: 4

## 2025-07-25 ASSESSMENT — LIFESTYLE VARIABLES
VISUAL DISTURBANCES: NOT PRESENT
ANXIETY: 2
AUDITORY DISTURBANCES: NOT PRESENT
ANXIETY: NO ANXIETY, AT EASE
TREMOR: NOT VISIBLE, BUT CAN BE FELT FINGERTIP TO FINGERTIP
ORIENTATION AND CLOUDING OF SENSORIUM: ORIENTED AND CAN DO SERIAL ADDITIONS
ORIENTATION AND CLOUDING OF SENSORIUM: ORIENTED AND CAN DO SERIAL ADDITIONS
PAROXYSMAL SWEATS: NO SWEAT VISIBLE
HEADACHE, FULLNESS IN HEAD: NOT PRESENT
TOTAL SCORE: 2
TREMOR: 2
NAUSEA AND VOMITING: NO NAUSEA AND NO VOMITING
ANXIETY: NO ANXIETY, AT EASE
ANXIETY: NO ANXIETY, AT EASE
ORIENTATION AND CLOUDING OF SENSORIUM: ORIENTED AND CAN DO SERIAL ADDITIONS
VISUAL DISTURBANCES: NOT PRESENT
TOTAL SCORE: 4
ORIENTATION AND CLOUDING OF SENSORIUM: ORIENTED AND CAN DO SERIAL ADDITIONS
TREMOR: NOT VISIBLE, BUT CAN BE FELT FINGERTIP TO FINGERTIP
HEADACHE, FULLNESS IN HEAD: NOT PRESENT
AUDITORY DISTURBANCES: NOT PRESENT
AGITATION: NORMAL ACTIVITY
TOTAL SCORE: 2
AGITATION: NORMAL ACTIVITY
PAROXYSMAL SWEATS: NO SWEAT VISIBLE
ORIENTATION AND CLOUDING OF SENSORIUM: ORIENTED AND CAN DO SERIAL ADDITIONS
HEADACHE, FULLNESS IN HEAD: NOT PRESENT
HEADACHE, FULLNESS IN HEAD: MILD
NAUSEA AND VOMITING: NO NAUSEA AND NO VOMITING
ORIENTATION AND CLOUDING OF SENSORIUM: ORIENTED AND CAN DO SERIAL ADDITIONS
ORIENTATION AND CLOUDING OF SENSORIUM: ORIENTED AND CAN DO SERIAL ADDITIONS
AUDITORY DISTURBANCES: NOT PRESENT
AGITATION: NORMAL ACTIVITY
TOTAL SCORE: 1
AUDITORY DISTURBANCES: NOT PRESENT
VISUAL DISTURBANCES: NOT PRESENT
TOTAL SCORE: 4
VISUAL DISTURBANCES: NOT PRESENT
NAUSEA AND VOMITING: MILD NAUSEA WITH NO VOMITING
NAUSEA AND VOMITING: NO NAUSEA AND NO VOMITING
VISUAL DISTURBANCES: NOT PRESENT
AUDITORY DISTURBANCES: NOT PRESENT
NAUSEA AND VOMITING: 2
AGITATION: NORMAL ACTIVITY
PAROXYSMAL SWEATS: NO SWEAT VISIBLE
TREMOR: NOT VISIBLE, BUT CAN BE FELT FINGERTIP TO FINGERTIP
AGITATION: NORMAL ACTIVITY
TREMOR: NOT VISIBLE, BUT CAN BE FELT FINGERTIP TO FINGERTIP
VISUAL DISTURBANCES: NOT PRESENT
NAUSEA AND VOMITING: NO NAUSEA AND NO VOMITING
TOTAL SCORE: 3
VISUAL DISTURBANCES: NOT PRESENT
ANXIETY: NO ANXIETY, AT EASE
AGITATION: NORMAL ACTIVITY
ANXIETY: NO ANXIETY, AT EASE
TREMOR: 2
PAROXYSMAL SWEATS: NO SWEAT VISIBLE
PAROXYSMAL SWEATS: NO SWEAT VISIBLE
TREMOR: 2
AGITATION: NORMAL ACTIVITY
ANXIETY: 2
PAROXYSMAL SWEATS: NO SWEAT VISIBLE
NAUSEA AND VOMITING: NO NAUSEA AND NO VOMITING
PAROXYSMAL SWEATS: NO SWEAT VISIBLE
AUDITORY DISTURBANCES: NOT PRESENT
HEADACHE, FULLNESS IN HEAD: NOT PRESENT
PAROXYSMAL SWEATS: NO SWEAT VISIBLE
VISUAL DISTURBANCES: NOT PRESENT
ORIENTATION AND CLOUDING OF SENSORIUM: ORIENTED AND CAN DO SERIAL ADDITIONS
AGITATION: NORMAL ACTIVITY
AUDITORY DISTURBANCES: NOT PRESENT
HEADACHE, FULLNESS IN HEAD: NOT PRESENT
TACTILE DISTURBANCES: VERY MILD ITCHING, PINS AND NEEDLES, BURNING OR NUMBNESS
HEADACHE, FULLNESS IN HEAD: NOT PRESENT
HEADACHE, FULLNESS IN HEAD: VERY MILD
NAUSEA AND VOMITING: MILD NAUSEA WITH NO VOMITING
TOTAL SCORE: 8
AUDITORY DISTURBANCES: NOT PRESENT
TOTAL SCORE: 1
ANXIETY: NO ANXIETY, AT EASE
TREMOR: 3

## 2025-07-25 ASSESSMENT — ENCOUNTER SYMPTOMS
HALLUCINATIONS: 0
RHINORRHEA: 0
DIFFICULTY URINATING: 0
DIZZINESS: 1
EYE REDNESS: 0
NAUSEA: 0
NECK PAIN: 1
VOMITING: 0
TROUBLE SWALLOWING: 0
PALPITATIONS: 0
STRIDOR: 0
AGITATION: 1
EYE DISCHARGE: 0
WOUND: 0
APPETITE CHANGE: 0
FATIGUE: 0
HEADACHES: 1
CONFUSION: 0
ARTHRALGIAS: 1
WHEEZING: 0

## 2025-07-25 ASSESSMENT — COGNITIVE AND FUNCTIONAL STATUS - GENERAL
MOVING FROM LYING ON BACK TO SITTING ON SIDE OF FLAT BED WITH BEDRAILS: A LITTLE
TURNING FROM BACK TO SIDE WHILE IN FLAT BAD: A LITTLE
STANDING UP FROM CHAIR USING ARMS: A LITTLE
CLIMB 3 TO 5 STEPS WITH RAILING: A LOT
MOVING TO AND FROM BED TO CHAIR: A LITTLE
WALKING IN HOSPITAL ROOM: A LITTLE
MOBILITY SCORE: 17

## 2025-07-25 ASSESSMENT — PAIN DESCRIPTION - ORIENTATION
ORIENTATION: LEFT

## 2025-07-25 ASSESSMENT — PAIN - FUNCTIONAL ASSESSMENT
PAIN_FUNCTIONAL_ASSESSMENT: 0-10

## 2025-07-25 ASSESSMENT — PAIN DESCRIPTION - DESCRIPTORS
DESCRIPTORS: ACHING
DESCRIPTORS: SHARP
DESCRIPTORS: ACHING

## 2025-07-25 ASSESSMENT — ACTIVITIES OF DAILY LIVING (ADL): ADL_ASSISTANCE: INDEPENDENT

## 2025-07-25 ASSESSMENT — PAIN DESCRIPTION - LOCATION
LOCATION: SHOULDER
LOCATION: SHOULDER

## 2025-07-25 NOTE — CARE PLAN
The patient's goals for the shift include  Patient will have less anxiety during the shift    The clinical goals for the shift include Patient will remain safe during the shift    Over the shift, the patient did not make progress toward the following goals. Barriers to progression include Patient still having high ciwa scores. Recommendations to address these barriers include monitor and treat  Problem: Pain - Adult  Goal: Verbalizes/displays adequate comfort level or baseline comfort level  Outcome: Progressing     Problem: Safety - Adult  Goal: Free from fall injury  Outcome: Progressing     Problem: Discharge Planning  Goal: Discharge to home or other facility with appropriate resources  Outcome: Progressing     Problem: Chronic Conditions and Co-morbidities  Goal: Patient's chronic conditions and co-morbidity symptoms are monitored and maintained or improved  Outcome: Progressing   .

## 2025-07-25 NOTE — PROGRESS NOTES
Physical Therapy    Physical Therapy Evaluation    Patient Name: Storm Barrios  MRN: [id number]  Department: [facility]  Room: 6066/6066-B  Today's Date: 7/25/2025   Time Calculation  Start Time: 1047  Stop Time: 1057  Time Calculation (min): 10 min    Assessment/Plan   PT Assessment  PT Assessment Results: Decreased strength, Decreased endurance, Impaired balance, Decreased mobility  Rehab Prognosis: Good  Barriers to Discharge Home: No anticipated barriers  Evaluation/Treatment Tolerance: Patient tolerated treatment well  Medical Staff Made Aware: Yes  Strengths: Attitude of self  Barriers to Participation: Comorbidities  End of Session Communication: Bedside nurse  Assessment Comment: pt admitted after fall. pt stated he tripped on O2 tubing at home. pt IND at baseline, uses rollator. pt benefits from continued PT  at low intensity to address deficits in strength, balance and mobility .  End of Session Patient Position: Up in chair, Alarm on  IP OR SWING BED PT PLAN  Inpatient or Swing Bed: Inpatient  PT Plan  Treatment/Interventions: Bed mobility, Transfer training, Gait training, Stair training, Balance training, Strengthening, Endurance training, Range of motion, Therapeutic exercise, Therapeutic activity, Home exercise program, Positioning  PT Plan: Ongoing PT  PT Frequency: 3 times per week  PT Discharge Recommendations: Low intensity level of continued care  Equipment Recommended upon Discharge:  (tbd)  PT Recommended Transfer Status: Assist x1  PT - OK to Discharge: Yes    Subjective     PT Visit Info:  PT Received On: 07/25/25  General Visit Information:  General  Reason for Referral: fall.  Past Medical History Relevant to Rehab: PSUD (tobacco, alcohol) c/b alcoholic pancreatitis, alcoholic hepatitis, hodgkins lymphoma s/p radiation c/b radiation induced chronic respiratory failure (on 2-3L NC), history of upper extremity DVT (on Eliquis in 2022 for 3 months), grade D esophagitis, hypothyroidism,  depression  Family/Caregiver Present: No  Prior to Session Communication: Bedside nurse  Patient Position Received: Bed, 3 rail up, Alarm on  General Comment: pt supine alert and agreeable for PT  Home Living:  Home Living  Type of Home: Apartment (2nd level)  Lives With: Alone  Home Adaptive Equipment:  (rollator)  Home Layout: One level  Home Access: Elevator  Bathroom Shower/Tub: Tub/shower unit  Bathroom Equipment: Shower chair with back  Prior Level of Function:  Prior Function Per Pt/Caregiver Report  Level of Bryant: Independent with ADLs and functional transfers  ADL Assistance: Independent  Homemaking Assistance: Independent  Ambulatory Assistance: Independent (rollator)  Prior Function Comments: x3 falls  Precautions:  Precautions  Medical Precautions: Fall precautions, Oxygen therapy device and L/min (2L)             Objective   Pain:  Pain Assessment  Pain Assessment: 0-10  0-10 (Numeric) Pain Score: 0 - No pain  Cognition:  Cognition  Overall Cognitive Status: Within Functional Limits  Orientation Level: Oriented X4    General Assessments:                  Activity Tolerance  Endurance: Tolerates 10 - 20 min exercise with multiple rests    Sensation  Light Touch: No apparent deficits            Perception  Inattention/Neglect: Appears intact      Coordination  Movements are Fluid and Coordinated: Yes    Postural Control  Postural Control: Within Functional Limits    Static Sitting Balance  Static Sitting-Balance Support: No upper extremity supported, Feet supported  Static Sitting-Level of Assistance: Close supervision  Dynamic Sitting Balance  Dynamic Sitting-Balance Support: No upper extremity supported, Feet supported  Dynamic Sitting-Level of Assistance: Close supervision  Dynamic Sitting-Balance: Trunk control activities    Static Standing Balance  Static Standing-Balance Support: No upper extremity supported  Static Standing-Level of Assistance: Contact guard  Dynamic Standing  Balance  Dynamic Standing-Balance Support: No upper extremity supported  Dynamic Standing-Level of Assistance: Contact guard  Dynamic Standing-Balance: Turning  Functional Assessments:  Bed Mobility  Bed Mobility: Yes  Bed Mobility 1  Bed Mobility 1: Supine to sitting  Level of Assistance 1: Close supervision  Bed Mobility Comments 1: HOB elevated    Transfers  Transfer: Yes  Transfer 1  Transfer From 1: Bed to  Transfer to 1: Stand  Technique 1: Sit to stand  Transfer Device 1:  (none)  Transfer Level of Assistance 1: Hand held assistance, Minimum assistance  Trials/Comments 1: x1  Transfers 2  Transfer From 2: Stand to  Transfer to 2: Chair with arms, Sit  Technique 2: Stand to sit  Transfer Level of Assistance 2: Hand held assistance, Contact guard    Ambulation/Gait Training  Ambulation/Gait Training Performed: Yes  Ambulation/Gait Training 1  Surface 1: Level tile  Device 1: No device  Assistance 1: Hand held assistance, Contact guard  Quality of Gait 1: Narrow base of support  Comments/Distance (ft) 1: 2ft    Stairs  Stairs: No  Extremity/Trunk Assessments:  RLE   RLE : Within Functional Limits  LLE   LLE : Within Functional Limits  Outcome Measures:  [facility] Basic Mobility  Turning from your back to your side while in a flat bed without using bedrails: A little  Moving from lying on your back to sitting on the side of a flat bed without using bedrails: A little  Moving to and from bed to chair (including a wheelchair): A little  Standing up from a chair using your arms (e.g. wheelchair or bedside chair): A little  To walk in hospital room: A little  Climbing 3-5 steps with railing: A lot  Basic Mobility - Total Score: 17    Encounter Problems       Encounter Problems (Active)       Balance       pt will achieve >/=26/28 on Tinetti Assessment.        Start:  07/25/25    Expected End:  08/08/25               Mobility       STG - Patient will ambulate >/= 250ft SBA and LRAD       Start:  07/25/25    Expected End:   08/08/25            STG - Patient will ambulate up and down a curb/step SBA       Start:  07/25/25    Expected End:  08/08/25               PT Transfers       STG - Transfer from bed to chair SBA and LRAD       Start:  07/25/25    Expected End:  08/08/25            STG - Patient will perform bed mobility SBA       Start:  07/25/25    Expected End:  08/08/25            STG - Patient will transfer sit to and from stand SBA and LRAD       Start:  07/25/25    Expected End:  08/08/25                   Education Documentation  Precautions, taught by Priya Hope PT at 7/25/2025 11:46 AM.  Learner: Patient  Readiness: Acceptance  Method: Explanation  Response: Verbalizes Understanding  Comment: PT POC    Body Mechanics, taught by Priya Hope PT at 7/25/2025 11:46 AM.  Learner: Patient  Readiness: Acceptance  Method: Explanation  Response: Verbalizes Understanding  Comment: PT POC    Mobility Training, taught by Priya Hope PT at 7/25/2025 11:46 AM.  Learner: Patient  Readiness: Acceptance  Method: Explanation  Response: Verbalizes Understanding  Comment: PT POC    Education Comments  No comments found.          07/25/25 at 11:46 AM - Priya Hope PT

## 2025-07-25 NOTE — CONSULTS
Inpatient consult to Toxicology  Consult performed by: Kiet Betancourt MD  Consult ordered by: Purvi Rai MD  Reason for consult: alcohol withdrawal          Reason For Consult  Alcohol withdrawal    History Of Present Illness  Storm Barrios is a 40 y.o. man currently admitted for alcohol withdrawal and LLL pneumonia. Medical Toxicology is consulted for assistance with management of alcohol withdrawal refractory to medications patient has received. Patient is s/p phenobarbital loading in the ED and currently on phenobarbital taper and CIWA with PRN lorazepam. Has remained persistently tachycardic, and continues to have elevated CIWA scores for agitation, tremor and tachycardia. Patient states his last drink was just before presentation, the day prior. From documentation, he stated it was the morning of 7/23 (>48 hours prior). He states that he feels like he is withdrawing, and feels shaky and uncomfortable. He is also complaining of shoulder and neck pain from a fall. He is interested in stopping drinking. He states he may have felt confused and out of it, but is not confused right now. No nausea or vomiting. No flushing. No additional complaints. Does feel like the medicines he has received have helped, but he is still symptomatic.    He states he has been drinking for approximately 20 years, about 1 pint of vodka a day. Did have a period of abstinence that lasted about a year. Has attended rehab in the past, currently interested. He identifies triggers for drinking of boredom, depression/sadness. He states his withdrawal is usually tremor and anxiety. He believes he has had a withdrawal seizure remotely, but is unsure.     Past Medical History  He has no past medical history on file.   alcoholic pancreatitis, alcoholic hepatitis, hodgkins lymphoma s/p radiation c/b radiation induced chronic respiratory failure (on 2-3L NC), history of upper extremity DVT (on Eliquis in 2022 for 3 months), grade D  esophagitis, hypothyroidism, depression    Surgical History  He has no past surgical history on file. R side chest port     Social History  He reports that he has been smoking cigarettes. He has been exposed to tobacco smoke. He has never used smokeless tobacco. He reports current alcohol use of about 12.0 - 28.0 standard drinks of alcohol per week. No history on file for drug use.    Family History  Family History[1]     Allergies  Patient has no known allergies.    Review of Systems   Constitutional:  Negative for appetite change and fatigue.   HENT:  Negative for rhinorrhea, sneezing and trouble swallowing.    Eyes:  Negative for discharge and redness.   Respiratory:  Negative for wheezing and stridor.    Cardiovascular:  Negative for chest pain and palpitations.   Gastrointestinal:  Negative for nausea and vomiting.   Genitourinary:  Negative for decreased urine volume and difficulty urinating.   Musculoskeletal:  Positive for arthralgias and neck pain.   Skin:  Negative for rash and wound.   Neurological:  Positive for dizziness and headaches.   Psychiatric/Behavioral:  Positive for agitation. Negative for confusion and hallucinations.         Physical Exam  Constitutional:       General: He is not in acute distress.  HENT:      Head: Normocephalic and atraumatic.      Right Ear: External ear normal.      Left Ear: External ear normal.      Nose: Nose normal.      Mouth/Throat:      Mouth: Mucous membranes are moist.      Pharynx: Oropharynx is clear.     Eyes:      Extraocular Movements: Extraocular movements intact.      Conjunctiva/sclera: Conjunctivae normal.       Cardiovascular:      Rate and Rhythm: Normal rate and regular rhythm.   Pulmonary:      Effort: Pulmonary effort is normal. No respiratory distress.   Abdominal:      General: There is no distension.      Palpations: Abdomen is soft.     Musculoskeletal:         General: No swelling or deformity.      Cervical back: Normal range of motion and  "neck supple.     Skin:     General: Skin is warm and dry.     Neurological:      General: No focal deficit present.      Mental Status: He is alert and oriented to person, place, and time.     Psychiatric:      Comments: Calm and cooperative.          Last Recorded Vitals  Blood pressure (!) 133/96, pulse 110, temperature 36.8 °C (98.2 °F), resp. rate 17, height 1.702 m (5' 7\"), weight 57.6 kg (126 lb 15.8 oz), SpO2 100%.    Relevant Results  Results for orders placed or performed during the hospital encounter of 07/23/25 (from the past 96 hours)   ECG 12 lead   Result Value Ref Range    Ventricular Rate 136 BPM    Atrial Rate 136 BPM    ME Interval 130 ms    QRS Duration 74 ms    QT Interval 286 ms    QTC Calculation(Bazett) 430 ms    P Axis 42 degrees    R Axis 51 degrees    T Axis 60 degrees    QRS Count 22 beats    Q Onset 218 ms    P Onset 153 ms    P Offset 203 ms    T Offset 361 ms    QTC Fredericia 375 ms   CBC and Auto Differential   Result Value Ref Range    WBC 15.0 (H) 4.4 - 11.3 x10*3/uL    nRBC 0.0 0.0 - 0.0 /100 WBCs    RBC 2.64 (L) 4.50 - 5.90 x10*6/uL    Hemoglobin 7.6 (L) 13.5 - 17.5 g/dL    Hematocrit 22.0 (L) 41.0 - 52.0 %    MCV 83 80 - 100 fL    MCH 28.8 26.0 - 34.0 pg    MCHC 34.5 32.0 - 36.0 g/dL    RDW 16.2 (H) 11.5 - 14.5 %    Platelets 548 (H) 150 - 450 x10*3/uL    Neutrophils % 87.6 40.0 - 80.0 %    Immature Granulocytes %, Automated 0.8 0.0 - 0.9 %    Lymphocytes % 4.1 13.0 - 44.0 %    Monocytes % 6.7 2.0 - 10.0 %    Eosinophils % 0.0 0.0 - 6.0 %    Basophils % 0.8 0.0 - 2.0 %    Neutrophils Absolute 13.12 (H) 1.20 - 7.70 x10*3/uL    Immature Granulocytes Absolute, Automated 0.12 0.00 - 0.70 x10*3/uL    Lymphocytes Absolute 0.61 (L) 1.20 - 4.80 x10*3/uL    Monocytes Absolute 1.01 (H) 0.10 - 1.00 x10*3/uL    Eosinophils Absolute 0.00 0.00 - 0.70 x10*3/uL    Basophils Absolute 0.12 (H) 0.00 - 0.10 x10*3/uL   Magnesium   Result Value Ref Range    Magnesium 1.20 (L) 1.60 - 2.40 mg/dL "   Comprehensive metabolic panel   Result Value Ref Range    Glucose 194 (H) 74 - 99 mg/dL    Sodium 138 136 - 145 mmol/L    Potassium 3.7 3.5 - 5.3 mmol/L    Chloride 91 (L) 98 - 107 mmol/L    Bicarbonate 32 21 - 32 mmol/L    Anion Gap 19 10 - 20 mmol/L    Urea Nitrogen 6 6 - 23 mg/dL    Creatinine 0.84 0.50 - 1.30 mg/dL    eGFR >90 >60 mL/min/1.73m*2    Calcium 8.9 8.6 - 10.6 mg/dL    Albumin 3.9 3.4 - 5.0 g/dL    Alkaline Phosphatase 669 (H) 33 - 120 U/L    Total Protein 8.2 6.4 - 8.2 g/dL    AST 33 9 - 39 U/L    Bilirubin, Total 0.5 0.0 - 1.2 mg/dL    ALT 7 (L) 10 - 52 U/L   Blood Gas Venous Full Panel   Result Value Ref Range    POCT pH, Venous 7.48 (H) 7.33 - 7.43 pH    POCT pCO2, Venous 45 41 - 51 mm Hg    POCT pO2, Venous 54 (H) 35 - 45 mm Hg    POCT SO2, Venous 82 (H) 45 - 75 %    POCT Oxy Hemoglobin, Venous 80.4 (H) 45.0 - 75.0 %    POCT Hematocrit Calculated, Venous 54.0 (H) 41.0 - 52.0 %    POCT Sodium, Venous 137 136 - 145 mmol/L    POCT Potassium, Venous 4.0 3.5 - 5.3 mmol/L    POCT Chloride, Venous 94 (L) 98 - 107 mmol/L    POCT Ionized Calicum, Venous 1.04 (L) 1.10 - 1.33 mmol/L    POCT Glucose, Venous 203 (H) 74 - 99 mg/dL    POCT Lactate, Venous 2.2 (H) 0.4 - 2.0 mmol/L    POCT Base Excess, Venous 8.5 (H) -2.0 - 3.0 mmol/L    POCT HCO3 Calculated, Venous 33.5 (H) 22.0 - 26.0 mmol/L    POCT Hemoglobin, Venous 17.9 (H) 13.5 - 17.5 g/dL    POCT Anion Gap, Venous 14.0 10.0 - 25.0 mmol/L    Patient Temperature 37.0 degrees Celsius    FiO2 28 %   Hepatitis panel, acute   Result Value Ref Range    Hepatitis B Surface AG Nonreactive Nonreactive    Hepatitis A  AB- IgM Nonreactive Nonreactive    Hepatitis B Core AB; IgM Nonreactive Nonreactive    Hepatitis C AB Nonreactive Nonreactive   Alcohol   Result Value Ref Range    Alcohol <10 <=10 mg/dL   BLOOD GAS VENOUS FULL PANEL   Result Value Ref Range    POCT pH, Venous 7.47 (H) 7.33 - 7.43 pH    POCT pCO2, Venous 49 41 - 51 mm Hg    POCT pO2, Venous 51 (H) 35  - 45 mm Hg    POCT SO2, Venous 79 (H) 45 - 75 %    POCT Oxy Hemoglobin, Venous 77.0 (H) 45.0 - 75.0 %    POCT Hematocrit Calculated, Venous 27.0 (L) 41.0 - 52.0 %    POCT Sodium, Venous 138 136 - 145 mmol/L    POCT Potassium, Venous 4.1 3.5 - 5.3 mmol/L    POCT Chloride, Venous 97 (L) 98 - 107 mmol/L    POCT Ionized Calicum, Venous 1.06 (L) 1.10 - 1.33 mmol/L    POCT Glucose, Venous 186 (H) 74 - 99 mg/dL    POCT Lactate, Venous 1.4 0.4 - 2.0 mmol/L    POCT Base Excess, Venous 10.8 (H) -2.0 - 3.0 mmol/L    POCT HCO3 Calculated, Venous 35.7 (H) 22.0 - 26.0 mmol/L    POCT Hemoglobin, Venous 9.0 (L) 13.5 - 17.5 g/dL    POCT Anion Gap, Venous 9.0 (L) 10.0 - 25.0 mmol/L    Patient Temperature 37.0 degrees Celsius    FiO2 28 %   Urinalysis with Reflex Culture and Microscopic   Result Value Ref Range    Color, Urine Yellow Light-Yellow, Yellow, Dark-Yellow    Appearance, Urine Clear Clear    Specific Gravity, Urine >1.050 (N) 1.005 - 1.035    pH, Urine 7.0 5.0, 5.5, 6.0, 6.5, 7.0, 7.5, 8.0    Protein, Urine 30 (1+) (A) NEGATIVE, 10 (TRACE), 20 (TRACE) mg/dL    Glucose, Urine Normal Normal mg/dL    Blood, Urine NEGATIVE NEGATIVE mg/dL    Ketones, Urine 40 (2+) (A) NEGATIVE mg/dL    Bilirubin, Urine NEGATIVE NEGATIVE mg/dL    Urobilinogen, Urine Normal Normal mg/dL    Nitrite, Urine NEGATIVE NEGATIVE    Leukocyte Esterase, Urine NEGATIVE NEGATIVE   Extra Urine Gray Tube   Result Value Ref Range    Extra Tube     Drug Screen, Urine With Reflex to Confirmation   Result Value Ref Range    Amphetamine Screen, Urine Presumptive Negative Presumptive Negative    Barbiturate Screen, Urine Presumptive Positive (A) Presumptive Negative    Benzodiazepines Screen, Urine Presumptive Positive (A) Presumptive Negative    Cannabinoid Screen, Urine Presumptive Negative Presumptive Negative    Cocaine Metabolite Screen, Urine Presumptive Negative Presumptive Negative    Fentanyl Screen, Urine Presumptive Negative Presumptive Negative    Opiate  Screen, Urine Presumptive Negative Presumptive Negative    Oxycodone Screen, Urine Presumptive Negative Presumptive Negative    PCP Screen, Urine Presumptive Negative Presumptive Negative    Methadone Screen, Urine Presumptive Negative Presumptive Negative   Streptococcus pneumoniae Antigen, Urine    Specimen: Clean Catch/Voided; Urine   Result Value Ref Range    Streptococcus pneumoniae Ag, Urine Negative Negative   Legionella Antigen, Urine    Specimen: Clean Catch/Voided; Urine   Result Value Ref Range    L. pneumophila Urine Ag Negative Negative   Urinalysis Microscopic   Result Value Ref Range    WBC, Urine 1-5 1-5, NONE /HPF    RBC, Urine NONE NONE, 1-2, 3-5 /HPF    Mucus, Urine FEW Reference range not established. /LPF   Staphylococcus aureus/MRSA colonization, Culture    Specimen: Nares/Axilla/Groin; Swab   Result Value Ref Range    Staph/MRSA Screen Culture No Staphylococcus aureus isolated    CBC and Auto Differential   Result Value Ref Range    WBC 9.6 4.4 - 11.3 x10*3/uL    nRBC 0.0 0.0 - 0.0 /100 WBCs    RBC 2.68 (L) 4.50 - 5.90 x10*6/uL    Hemoglobin 7.7 (L) 13.5 - 17.5 g/dL    Hematocrit 23.8 (L) 41.0 - 52.0 %    MCV 89 80 - 100 fL    MCH 28.7 26.0 - 34.0 pg    MCHC 32.4 32.0 - 36.0 g/dL    RDW 17.2 (H) 11.5 - 14.5 %    Platelets 270 150 - 450 x10*3/uL    Neutrophils % 86.7 40.0 - 80.0 %    Immature Granulocytes %, Automated 0.5 0.0 - 0.9 %    Lymphocytes % 5.7 13.0 - 44.0 %    Monocytes % 6.2 2.0 - 10.0 %    Eosinophils % 0.2 0.0 - 6.0 %    Basophils % 0.7 0.0 - 2.0 %    Neutrophils Absolute 8.32 (H) 1.20 - 7.70 x10*3/uL    Immature Granulocytes Absolute, Automated 0.05 0.00 - 0.70 x10*3/uL    Lymphocytes Absolute 0.55 (L) 1.20 - 4.80 x10*3/uL    Monocytes Absolute 0.60 0.10 - 1.00 x10*3/uL    Eosinophils Absolute 0.02 0.00 - 0.70 x10*3/uL    Basophils Absolute 0.07 0.00 - 0.10 x10*3/uL   Magnesium   Result Value Ref Range    Magnesium 1.30 (L) 1.60 - 2.40 mg/dL   Renal Function Panel   Result Value  Ref Range    Glucose 122 (H) 74 - 99 mg/dL    Sodium 136 136 - 145 mmol/L    Potassium 3.4 (L) 3.5 - 5.3 mmol/L    Chloride 95 (L) 98 - 107 mmol/L    Bicarbonate 32 21 - 32 mmol/L    Anion Gap 12 10 - 20 mmol/L    Urea Nitrogen 4 (L) 6 - 23 mg/dL    Creatinine 0.84 0.50 - 1.30 mg/dL    eGFR >90 >60 mL/min/1.73m*2    Calcium 8.3 (L) 8.6 - 10.6 mg/dL    Phosphorus 1.4 (L) 2.5 - 4.9 mg/dL    Albumin 3.2 (L) 3.4 - 5.0 g/dL   TSH   Result Value Ref Range    Thyroid Stimulating Hormone 29.28 (H) 0.44 - 3.98 mIU/L     CT head wo IV contrast   Final Result   1. No acute intracranial abnormality.   2. No acute fracture or traumatic malalignment of the cervical spine.        I personally reviewed the images/study and I agree with the findings   as stated by resident physician Ramakrishna Nix MD. This study was   interpreted at [facility],   [place], [place].        MACRO:   None.        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   2.No significant change of small bilateral pleural effusions.   3.Interval increased patchy consolidative and ground-glass   opacities in the left lower lobe. Since previous CT, concerning for   aspiration/pneumonitis given the presents of reflux and fluid-filled   esophagus, which places the patient at increased risk for aspiration.   4.No acute findings in the abdomen and pelvis.   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   2.No significant change of small bilateral pleural effusions.   3.Interval increased patchy consolidative and ground-glass   opacities in the left lower lobe. Since previous CT, concerning for   aspiration/pneumonitis given the presents of reflux and fluid-filled   esophagus, which places the patient at increased risk for aspiration.   4.No acute findings in the abdomen and pelvis.   Signed by Baldev Luis MD      CT cervical spine wo IV contrast   Final Result   1. No acute intracranial abnormality.   2. No acute fracture or traumatic malalignment of the cervical spine.        I personally reviewed the images/study and I agree with the findings   as stated by resident physician Ramakrishna Nix MD. This study was   interpreted at [facility],   [place], [place].        MACRO:   None.        Signed by: Yaritza Melgoza 7/24/2025 12:50 AM   Dictation workstation:   [id number]             Assessment/Plan     40M currently admitted for alcohol withdrawal and pneumonia. Patient meets criteria for alcohol use disorder. He is currently mildly tremulous and tachycardic. He has received phenobarb loading (in addition to shorter acting agents) as well as additional scheduled phenobarb taper. He is >48 hours from his last drink, and I have very low suspicion that his withdrawal will progress or worsen beyond this point. He does have ongoing objective findings of withdrawal, and would recommend giving an additional dose of 64.8 mg phenobarbital now, which can be repeated as a PRN medication in addition to continuing the taper. Does seem to have good response to phenobarb with improvement in tachycardia and tremor.    Majority of patient's reported symptoms seem to be more subjective, and likely related to underlying anxiety now that he is no longer self-medicating with alcohol. Agree with continuing gabapentin, which will help with subjective withdrawal symptoms. He may  benefit from daily medication for anxiety/depression in the long term.    Given the severity of his alcohol use disorder, he would benefit from medication treatment. He is currently complaining of acute pain and has received opioid analgesics during this hospitalization, so I have held off on starting naltrexone, but this medication should be started as soon as his pain is better (50 mg daily indefinitely). I have started him on acamprosate in the mean time, 666 mg TID, and this should also be continued indefinitely and prescribed at discharge.    Continue thiamine, folate, multivitamin, indefinitely, and close electrolyte and nutritional support while in hospital.    Patient is interested in treatment options, and I have placed an order for social work to consult [facility]. Please ensure that patient speaks to [facility], and they will be able to help facilitate his disposition. If he wants to go to a facility/program then he should be discharged to one.    Patient and primary team updated. Please reach out via secure chat with additional questions.    I spent 60 minutes in the professional and overall care of this patient.               [1] No family history on file.

## 2025-07-25 NOTE — PROGRESS NOTES
Storm Barrios is a 40 y.o. male on day 1 of admission presenting with Alcohol withdrawal syndrome without complication (Multi).               Objective     Last Recorded Vitals  BP (!) 133/96   Pulse 110   Temp 36.8 °C (98.2 °F)   Resp 17   Wt 57.6 kg (126 lb 15.8 oz)   SpO2 100%   Intake/Output last 3 Shifts:    Intake/Output Summary (Last 24 hours) at 7/25/2025 1604  Last data filed at 7/25/2025 1519  Gross per 24 hour   Intake 1980 ml   Output 1000 ml   Net 980 ml       Admission Weight  Weight: 63.5 kg (140 lb) (07/23/25 2015)    Daily Weight  07/24/25 : 57.6 kg (126 lb 15.8 oz)    Image Results  ECG 12 lead  Sinus tachycardia  Otherwise normal ECG  No previous ECGs available  See ED provider note for full interpretation and clinical correlation  Confirmed by Valentin Almazan ([id number]) on 7/24/2025 5:30:54 AM  CT angio chest for pulmonary embolism  Narrative: STUDY:  CT Angiogram of the Chest, CT Abdomen and Pelvis with IV Contrast;  07/23/2025 11:47 PM  INDICATION:  Fall with right upper quadrant pain.  Additional History:  Malignancy,  tachycardia, questionable syncope.  COMPARISON:  CTA CAP 10/07/2021.  CT CAP 10/05/2021.  ACCESSION NUMBER(S):  [id number], [id number]  ORDERING CLINICIAN:  VANDANA WHITTINGTON  TECHNIQUE:  CTA of the chest was performed following rapid injection  of intravenous contrast.  Images are reviewed and processed at a  workstation according to the CT angiogram protocol with 3-D and/or MIP  post processing imaging generated.  CT of the abdomen and pelvis was  performed with intravenous contrast.  100 mL Omnipaque-350 was  administered intravenously; positive oral contrast was given.  1178  DICOM images received.  Automated mA/kV exposure control was utilized and patient examination  was performed in strict accordance with principles of ALARA.  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   Pulmonary arteries are adequately opacified without acute or chronic  filling defects.  The thoracic aorta is normal in course and caliber  without dissection or aneurysm.  The heart is normal in size without pericardial effusion.  Thoracic  lymph nodes are not enlarged.  Anterior mediastinal ill-defined soft  tissue and metastatic containing calcifications may represent an old  hematoma or mediastinal fibrosis.    There is small bilateral pleural effusions.  Compressive atelectasis  in the lung bases.  Interval increased patchy consolidative and  ground-glass opacities in the left lower lobe.   Since previous CT, concerning for aspiration/pneumonitis given the  presents of reflux and fluid-filled esophagus, which places the  patient at increased risk for aspiration.  ABDOMEN:     LIVER:  No hepatomegaly.  The left lobe of the liver has been resected.  There  is nodular contour of the right lobe of liver mass be due to liver  cirrhosis..      BILE DUCTS:  No intrahepatic or extrahepatic biliary ductal dilatation.     GALLBLADDER:  The gallbladder is normal.     STOMACH:  No abnormalities identified.     PANCREAS:  No masses or ductal dilatation     SPLEEN:  No splenomegaly or focal splenic lesion.     ADRENAL GLANDS:  No thickening or nodules.     KIDNEYS AND URETERS:  Kidneys are normal in size and location.  No renal or ureteral  calculi.     PELVIS:     BLADDER:  No abnormalities identified.     REPRODUCTIVE ORGANS:  No abnormalities identified.     BOWEL:  No abnormalities identified.     VESSELS:  No abnormalities identified.  Abdominal aorta is normal in caliber.      PERITONEUM/RETROPERITONEUM/LYMPH NODES:  No free fluid.  No pneumoperitoneum.  No lymphadenopathy.     ABDOMINAL WALL:  No abnormalities identified.  SOFT TISSUES:   No abnormalities identified.     BONES:  No acute fracture or  aggressive osseous lesion.  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  2.No significant change of small bilateral pleural effusions.  3.Interval increased patchy consolidative and ground-glass  opacities in the left lower lobe. Since previous CT, concerning for  aspiration/pneumonitis given the presents of reflux and fluid-filled  esophagus, which places the patient at increased risk for aspiration.  4.No acute findings in the abdomen and pelvis.  Signed by Baldev Luis MD  CT abdomen pelvis w IV contrast  Narrative: STUDY:  CT Angiogram of the Chest, CT Abdomen and Pelvis with IV Contrast;  07/23/2025 11:47 PM  INDICATION:  Fall with right upper quadrant pain.  Additional History:  Malignancy,  tachycardia, questionable syncope.  COMPARISON:  CTA CAP 10/07/2021.  CT CAP 10/05/2021.  ACCESSION NUMBER(S):  [id number], [id number]  ORDERING CLINICIAN:  VANDANA WHITTINGTON  TECHNIQUE:  CTA of the chest was performed following rapid injection  of intravenous contrast.  Images are reviewed and processed at a  workstation according to the CT angiogram protocol with 3-D and/or MIP  post processing imaging generated.  CT of the abdomen and pelvis was  performed with intravenous contrast.  100 mL Omnipaque-350 was  administered intravenously; positive oral contrast was given.  1178  DICOM images received.  Automated mA/kV exposure control was utilized and patient examination  was performed in strict accordance with principles of ALARA.  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   Pulmonary arteries are adequately opacified without acute or  chronic  filling defects.  The thoracic aorta is normal in course and caliber  without dissection or aneurysm.  The heart is normal in size without pericardial effusion.  Thoracic  lymph nodes are not enlarged.  Anterior mediastinal ill-defined soft  tissue and metastatic containing calcifications may represent an old  hematoma or mediastinal fibrosis.    There is small bilateral pleural effusions.  Compressive atelectasis  in the lung bases.  Interval increased patchy consolidative and  ground-glass opacities in the left lower lobe.   Since previous CT, concerning for aspiration/pneumonitis given the  presents of reflux and fluid-filled esophagus, which places the  patient at increased risk for aspiration.  ABDOMEN:     LIVER:  No hepatomegaly.  The left lobe of the liver has been resected.  There  is nodular contour of the right lobe of liver mass be due to liver  cirrhosis..      BILE DUCTS:  No intrahepatic or extrahepatic biliary ductal dilatation.     GALLBLADDER:  The gallbladder is normal.     STOMACH:  No abnormalities identified.     PANCREAS:  No masses or ductal dilatation     SPLEEN:  No splenomegaly or focal splenic lesion.     ADRENAL GLANDS:  No thickening or nodules.     KIDNEYS AND URETERS:  Kidneys are normal in size and location.  No renal or ureteral  calculi.     PELVIS:     BLADDER:  No abnormalities identified.     REPRODUCTIVE ORGANS:  No abnormalities identified.     BOWEL:  No abnormalities identified.     VESSELS:  No abnormalities identified.  Abdominal aorta is normal in caliber.      PERITONEUM/RETROPERITONEUM/LYMPH NODES:  No free fluid.  No pneumoperitoneum.  No lymphadenopathy.     ABDOMINAL WALL:  No abnormalities identified.  SOFT TISSUES:   No abnormalities identified.     BONES:  No acute fracture or aggressive osseous lesion.  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  2.No significant change of small bilateral pleural effusions.  3.Interval increased patchy consolidative and ground-glass  opacities in the left lower lobe. Since previous CT, concerning for  aspiration/pneumonitis given the presents of reflux and fluid-filled  esophagus, which places the patient at increased risk for aspiration.  4.No acute findings in the abdomen and pelvis.  Signed by Baldev Luis MD  CT head wo IV contrast, CT cervical spine wo IV contrast  Narrative: Interpreted By:  Yaritza Melgoza and Liu Scott   STUDY:  CT HEAD WO IV CONTRAST; CT CERVICAL SPINE WO IV CONTRAST;  7/23/2025  11:47 pm      INDICATION:  Signs/Symptoms:fall with headstrike      COMPARISON:  CT head and C-spine 10/05/2021      ACCESSION NUMBER(S):  [id number]; [id number]      ORDERING CLINICIAN:  VANDANA WHITTINGTON      TECHNIQUE:  Axial noncontrast CT images of head with coronal and sagittal  reconstructed images. Axial noncontrast CT images of the cervical  spine with coronal and sagittal reconstructed images.      FINDINGS:  CT HEAD:      The gray-white differentiation is intact. No intraparenchymal  hemorrhage. Mild global parenchymal volume loss. No abnormal  extra-axial fluid collection. No mass effect, midline shift, or  hydrocephalus. No calvarial fracture or sizable scalp hematoma.  Paranasal sinuses, mastoids, visualized orbits are unremarkable.          CT CERVICAL SPINE:      The craniocervical junction is intact. No traumatic malalignment or  traumatic facet widening. No acute vertebral body fracture. No  high-grade spinal canal or neural foraminal stenosis. Paraspinal soft  tissues are unremarkable.      Impression: 1. No acute intracranial abnormality.  2. No acute fracture or traumatic malalignment of the cervical spine.      I personally reviewed the images/study and I agree with the findings  as stated by resident physician  Ramakrishna Nix MD. This study was  interpreted at [facility],  [facility], [place].      MACRO:  None.      Signed by: Yaritza Melgoza 7/24/2025 12:50 AM  Dictation workstation:   [id number]      Physical Exam    Constitutional: ill appearing male in no acute distress, appears stated age  HEENT: NC/AT, sclera anicteric  Respiratory: CTAB.    Cardiovascular: Tachycardic, No murmurs, gallops, or rubs  Abdominal: Soft, nontender, nondistended. Bowel sounds present  Neuro: AAOx4 , CN II-XII grossly intact. Tremor noted  MSK: No LE edema bilaterally, moving extremities well  Skin: Warm, dry. Well perfused  Psych: Appropriate mood and affect  Mediport  in place   Relevant Results                  This patient has a central line   Reason for the central line remaining today? Parenteral nutrition            Assessment & Plan  Alcohol withdrawal syndrome without complication (Multi)          Subjective   -Doing well, HDS, tachy, he had uneventful night.   -He missed his morning phenobarbital dose, per RN, was not on floor so had  to get it from onother floor, he was given extra dose around 12 PM . RN was notified.   -Vargas was removed. Started TOV  -MRSA screen is negative   -He has Mediport giving his Hx of lymphoma  -Streptococcus pneumoniae, L. pneumophila Urine Ag Negative   -PTOT requested, recommended HHC.   -[facility] requested by [facility]   -He could be clear home by tomorrow   -Tremulous and tachycardic.    CINTHIA      Storm Barrios is a 40 y.o. male with PMHx including PSUD (tobacco, alcohol) c/b alcoholic pancreatitis, alcoholic hepatitis, hodgkins lymphoma s/p radiation c/b radiation induced chronic respiratory failure (on 3L NC), history of upper extremity DVT (on Eliquis in 2022 for 3 months), grade D esophagitis, hypothyroidism, depression presenting via EMS after mechanical fall, with CTH and C spine negative, he was admitted for EtOH withdrawal, s/p phenobarb load in ED. CIWA 9>10 on  admission, notes hx of seizures related to withdrawal.       #EtOH withdrawl  #AUD  -UDS: Barbiturate and Benzodiazepines   -Toxicology consulted. Appreciate recs.  -Showed good response to phenobarb with improvement in tachycardia and tremor.   -Per toxicology team: He is currently complaining of acute pain and has received opioid analgesics during this hospitalization, so I have held off on starting naltrexone, but this medication should be started as soon as his pain is better (50 mg daily indefinitely). I have started him on acamprosate in the mean time, 666 mg TID, and this should also be continued indefinitely and prescribed at discharge.  -Thiamine, folate, multivitamin.  -Social work for [facility].   -Phenobarb taper     -c/w home gabapentin 300mg TID  -Home hydroxyzine prn        #Electrolytes abnormalities due to substance use disorder and AUD   #Hypokalemia replaced   #Hypophosphatemia replaced   #Hypomagnesemia        #Sinus Tachycardia, likely due to Alcohol withdrawal  vs dehydration. Resolving     -EKG reviewed  -Added 1l LR.  -CIWA in place     #BPH  -Added Flomax 0.4 mg daily       #malnutrition  -nutrition consulted  -added Ensure+ TID   -watch for refeeding sy      #Fall, mechanical  -trauma richardson negative  -tyl 650mg q6hr prn  -lidocaine patch  -PTOT    #LLL PNA, likely aspiration  -CTX 1g q24hr  -azithromycin 500mg   -consider 5-7 day total course   -Negative urine strep, legionella      #Grade D esophagitis  ::noted per 3/2025 [facility]GD at [facility], pt is due for repeat EGD to assess healing  -c/w PPI BID  -refer back to GI upon discharge      #Hypothyroidism  -c/w levothyroxine    #Depression  -c/w lexapro  -c/w seroquel   -Fu with psych as OP    #hodgkins lymphoma s/p radiation c/b radiation induced chronic respiratory failure (on 3L NC)  ::remote hx, chart diagnoses dating back to at least 2021, pt reports it was when he was a teen, potentially involved liver (noted resection per CT)  -seemingly in  remission       N:regular   Access/Lines: piV  DVT prophylaxis: Lovenox sq/SCDs   O2: on RA    Code Status: Full code (discussed with patient at bedside upon admission)   NOK: Keaton (child) [phone]           Time 40 min           Malnutrition Diagnosis Status: New  Malnutrition Diagnosis: Moderate malnutrition related to chronic disease or condition     As Evidenced by: >7.5% weight loss x 3 mos (11.8% x ~ 5mos), mod body fat and muscle loss, <75% of EER >5 days with suspected long-term decreased intake  I agree with the dietitian's malnutrition diagnosis.    Purvi Rai MD

## 2025-07-25 NOTE — PROGRESS NOTES
07/25/25 1124   Rapid Rounds   Attendance Provider;Care Transitions   Expected Discharge Disposition Home   Today we still await: Clinical stability   Review at Escalation Rounds No escalation needed     Social Work Note    LSW met with Pt Med Team. Per med team Pt pending addictive meds consult. Care transitions available to assist with any future discharge needs.       JENNYFER Childers

## 2025-07-25 NOTE — CARE PLAN
The patient's goals for the shift include      The clinical goals for the shift include Patient will remain safe duuring the shift

## 2025-07-26 ENCOUNTER — PHARMACY VISIT (OUTPATIENT)
Dept: PHARMACY | Facility: CLINIC | Age: 40
End: 2025-07-26
Payer: MEDICAID

## 2025-07-26 VITALS
RESPIRATION RATE: 16 BRPM | WEIGHT: 126.98 LBS | OXYGEN SATURATION: 100 % | SYSTOLIC BLOOD PRESSURE: 120 MMHG | BODY MASS INDEX: 19.93 KG/M2 | HEIGHT: 67 IN | DIASTOLIC BLOOD PRESSURE: 93 MMHG | HEART RATE: 112 BPM | TEMPERATURE: 97.9 F

## 2025-07-26 LAB
1OH-MIDAZOLAM UR CFM-MCNC: <25 NG/ML
7AMINOCLONAZEPAM UR CFM-MCNC: ABNORMAL NG/ML
A-OH ALPRAZ UR CFM-MCNC: <25 NG/ML
ALBUMIN SERPL BCP-MCNC: 3 G/DL (ref 3.4–5)
ALPRAZ UR CFM-MCNC: <25 NG/ML
ANION GAP SERPL CALC-SCNC: 13 MMOL/L (ref 10–20)
BASOPHILS # BLD AUTO: 0.04 X10*3/UL (ref 0–0.1)
BASOPHILS NFR BLD AUTO: 0.6 %
BUN SERPL-MCNC: 4 MG/DL (ref 6–23)
CALCIUM SERPL-MCNC: 8.2 MG/DL (ref 8.6–10.6)
CHLORDIAZEP UR CFM-MCNC: <25 NG/ML
CHLORIDE SERPL-SCNC: 101 MMOL/L (ref 98–107)
CLONAZEPAM UR CFM-MCNC: <25 NG/ML
CO2 SERPL-SCNC: 29 MMOL/L (ref 21–32)
CREAT SERPL-MCNC: 0.63 MG/DL (ref 0.5–1.3)
DIAZEPAM UR CFM-MCNC: <25 NG/ML
EGFRCR SERPLBLD CKD-EPI 2021: >90 ML/MIN/1.73M*2
EOSINOPHIL # BLD AUTO: 0.11 X10*3/UL (ref 0–0.7)
EOSINOPHIL NFR BLD AUTO: 1.6 %
ERYTHROCYTE [DISTWIDTH] IN BLOOD BY AUTOMATED COUNT: 17.2 % (ref 11.5–14.5)
GLUCOSE SERPL-MCNC: 87 MG/DL (ref 74–99)
HCT VFR BLD AUTO: 23.2 % (ref 41–52)
HGB BLD-MCNC: 7.6 G/DL (ref 13.5–17.5)
IMM GRANULOCYTES # BLD AUTO: 0.05 X10*3/UL (ref 0–0.7)
IMM GRANULOCYTES NFR BLD AUTO: 0.7 % (ref 0–0.9)
LORAZEPAM UR CFM-MCNC: 129 NG/ML
LYMPHOCYTES # BLD AUTO: 0.53 X10*3/UL (ref 1.2–4.8)
LYMPHOCYTES NFR BLD AUTO: 7.7 %
MAGNESIUM SERPL-MCNC: 1.57 MG/DL (ref 1.6–2.4)
MCH RBC QN AUTO: 29.1 PG (ref 26–34)
MCHC RBC AUTO-ENTMCNC: 32.8 G/DL (ref 32–36)
MCV RBC AUTO: 89 FL (ref 80–100)
MIDAZOLAM UR CFM-MCNC: <25 NG/ML
MONOCYTES # BLD AUTO: 0.48 X10*3/UL (ref 0.1–1)
MONOCYTES NFR BLD AUTO: 7 %
NEUTROPHILS # BLD AUTO: 5.63 X10*3/UL (ref 1.2–7.7)
NEUTROPHILS NFR BLD AUTO: 82.4 %
NORDIAZEPAM UR CFM-MCNC: <25 NG/ML
NRBC BLD-RTO: 0 /100 WBCS (ref 0–0)
OXAZEPAM UR CFM-MCNC: 78 NG/ML
PHOSPHATE SERPL-MCNC: 2.3 MG/DL (ref 2.5–4.9)
PLATELET # BLD AUTO: 182 X10*3/UL (ref 150–450)
POTASSIUM SERPL-SCNC: 4.2 MMOL/L (ref 3.5–5.3)
RBC # BLD AUTO: 2.61 X10*6/UL (ref 4.5–5.9)
SODIUM SERPL-SCNC: 139 MMOL/L (ref 136–145)
T4 FREE SERPL-MCNC: 0.87 NG/DL (ref 0.78–1.48)
TEMAZEPAM UR CFM-MCNC: <25 NG/ML
TSH SERPL-ACNC: 29.09 MIU/L (ref 0.44–3.98)
WBC # BLD AUTO: 6.8 X10*3/UL (ref 4.4–11.3)

## 2025-07-26 PROCEDURE — RXMED WILLOW AMBULATORY MEDICATION CHARGE

## 2025-07-26 PROCEDURE — 99232 SBSQ HOSP IP/OBS MODERATE 35: CPT | Performed by: EMERGENCY MEDICINE

## 2025-07-26 PROCEDURE — 85025 COMPLETE CBC W/AUTO DIFF WBC: CPT | Performed by: STUDENT IN AN ORGANIZED HEALTH CARE EDUCATION/TRAINING PROGRAM

## 2025-07-26 PROCEDURE — 83735 ASSAY OF MAGNESIUM: CPT | Performed by: STUDENT IN AN ORGANIZED HEALTH CARE EDUCATION/TRAINING PROGRAM

## 2025-07-26 PROCEDURE — 99254 IP/OBS CNSLTJ NEW/EST MOD 60: CPT | Performed by: STUDENT IN AN ORGANIZED HEALTH CARE EDUCATION/TRAINING PROGRAM

## 2025-07-26 PROCEDURE — 2500000001 HC RX 250 WO HCPCS SELF ADMINISTERED DRUGS (ALT 637 FOR MEDICARE OP): Mod: SE | Performed by: STUDENT IN AN ORGANIZED HEALTH CARE EDUCATION/TRAINING PROGRAM

## 2025-07-26 PROCEDURE — 84439 ASSAY OF FREE THYROXINE: CPT

## 2025-07-26 PROCEDURE — 2500000002 HC RX 250 W HCPCS SELF ADMINISTERED DRUGS (ALT 637 FOR MEDICARE OP, ALT 636 FOR OP/ED): Mod: SE | Performed by: STUDENT IN AN ORGANIZED HEALTH CARE EDUCATION/TRAINING PROGRAM

## 2025-07-26 PROCEDURE — 84443 ASSAY THYROID STIM HORMONE: CPT

## 2025-07-26 PROCEDURE — 80069 RENAL FUNCTION PANEL: CPT | Performed by: STUDENT IN AN ORGANIZED HEALTH CARE EDUCATION/TRAINING PROGRAM

## 2025-07-26 PROCEDURE — 2500000001 HC RX 250 WO HCPCS SELF ADMINISTERED DRUGS (ALT 637 FOR MEDICARE OP): Mod: SE

## 2025-07-26 PROCEDURE — 2500000005 HC RX 250 GENERAL PHARMACY W/O HCPCS: Mod: SE

## 2025-07-26 PROCEDURE — 2500000002 HC RX 250 W HCPCS SELF ADMINISTERED DRUGS (ALT 637 FOR MEDICARE OP, ALT 636 FOR OP/ED): Mod: SE

## 2025-07-26 PROCEDURE — 99238 HOSP IP/OBS DSCHRG MGMT 30/<: CPT | Performed by: STUDENT IN AN ORGANIZED HEALTH CARE EDUCATION/TRAINING PROGRAM

## 2025-07-26 PROCEDURE — 2500000001 HC RX 250 WO HCPCS SELF ADMINISTERED DRUGS (ALT 637 FOR MEDICARE OP): Mod: SE | Performed by: EMERGENCY MEDICINE

## 2025-07-26 PROCEDURE — 2500000004 HC RX 250 GENERAL PHARMACY W/ HCPCS (ALT 636 FOR OP/ED): Mod: SE | Performed by: STUDENT IN AN ORGANIZED HEALTH CARE EDUCATION/TRAINING PROGRAM

## 2025-07-26 PROCEDURE — 2500000004 HC RX 250 GENERAL PHARMACY W/ HCPCS (ALT 636 FOR OP/ED): Mod: SE

## 2025-07-26 PROCEDURE — 36415 COLL VENOUS BLD VENIPUNCTURE: CPT

## 2025-07-26 RX ORDER — FOLIC ACID 1 MG/1
1 TABLET ORAL DAILY
Qty: 30 TABLET | Refills: 3 | Status: SHIPPED | OUTPATIENT
Start: 2025-07-26 | End: 2025-11-23

## 2025-07-26 RX ORDER — MAGNESIUM SULFATE HEPTAHYDRATE 40 MG/ML
2 INJECTION, SOLUTION INTRAVENOUS ONCE
Status: DISCONTINUED | OUTPATIENT
Start: 2025-07-26 | End: 2025-07-26 | Stop reason: HOSPADM

## 2025-07-26 RX ORDER — ONDANSETRON HYDROCHLORIDE 2 MG/ML
4 INJECTION, SOLUTION INTRAVENOUS ONCE
Status: COMPLETED | OUTPATIENT
Start: 2025-07-26 | End: 2025-07-26

## 2025-07-26 RX ORDER — METHOCARBAMOL 500 MG/1
500 TABLET, FILM COATED ORAL 3 TIMES DAILY PRN
Qty: 20 TABLET | Refills: 0 | Status: SHIPPED | OUTPATIENT
Start: 2025-07-26

## 2025-07-26 RX ORDER — ACETAMINOPHEN 500 MG
1000 TABLET ORAL EVERY 6 HOURS PRN
Qty: 30 TABLET | Refills: 0 | Status: SHIPPED | OUTPATIENT
Start: 2025-07-26

## 2025-07-26 RX ORDER — TAMSULOSIN HYDROCHLORIDE 0.4 MG/1
0.4 CAPSULE ORAL DAILY
Qty: 30 CAPSULE | Refills: 0 | Status: SHIPPED | OUTPATIENT
Start: 2025-07-27 | End: 2025-08-26

## 2025-07-26 RX ORDER — QUETIAPINE FUMARATE 25 MG/1
25 TABLET, FILM COATED ORAL NIGHTLY
Qty: 30 TABLET | Refills: 0 | Status: SHIPPED | OUTPATIENT
Start: 2025-07-26 | End: 2025-08-25

## 2025-07-26 RX ORDER — LANOLIN ALCOHOL/MO/W.PET/CERES
CREAM (GRAM) TOPICAL 2 TIMES DAILY
Qty: 60 TABLET | Refills: 0 | Status: SHIPPED | OUTPATIENT
Start: 2025-07-26

## 2025-07-26 RX ORDER — POLYETHYLENE GLYCOL 3350 17 G/17G
17 POWDER, FOR SOLUTION ORAL DAILY PRN
Qty: 510 G | Refills: 0 | Status: SHIPPED | OUTPATIENT
Start: 2025-07-26

## 2025-07-26 RX ORDER — LANOLIN ALCOHOL/MO/W.PET/CERES
100 CREAM (GRAM) TOPICAL DAILY
Qty: 30 TABLET | Refills: 3 | Status: SHIPPED | OUTPATIENT
Start: 2025-07-26 | End: 2025-11-23

## 2025-07-26 RX ORDER — LEVETIRACETAM 1000 MG/1
1000 TABLET ORAL DAILY
Qty: 30 TABLET | Refills: 0 | Status: SHIPPED | OUTPATIENT
Start: 2025-07-26 | End: 2025-08-25

## 2025-07-26 RX ORDER — MAGNESIUM SULFATE HEPTAHYDRATE 40 MG/ML
2 INJECTION, SOLUTION INTRAVENOUS ONCE
Status: COMPLETED | OUTPATIENT
Start: 2025-07-26 | End: 2025-07-26

## 2025-07-26 RX ORDER — MIRTAZAPINE 15 MG/1
15 TABLET, FILM COATED ORAL NIGHTLY
Qty: 30 TABLET | Refills: 0 | Status: SHIPPED | OUTPATIENT
Start: 2025-07-26 | End: 2025-08-25

## 2025-07-26 RX ORDER — AMOXICILLIN AND CLAVULANATE POTASSIUM 875; 125 MG/1; MG/1
1 TABLET, FILM COATED ORAL 2 TIMES DAILY
Qty: 6 TABLET | Refills: 0 | Status: SHIPPED | OUTPATIENT
Start: 2025-07-26 | End: 2025-07-29

## 2025-07-26 RX ORDER — NALTREXONE HYDROCHLORIDE 50 MG/1
50 TABLET, FILM COATED ORAL DAILY
Status: DISCONTINUED | OUTPATIENT
Start: 2025-07-26 | End: 2025-07-26 | Stop reason: HOSPADM

## 2025-07-26 RX ORDER — GABAPENTIN 300 MG/1
300 CAPSULE ORAL 3 TIMES DAILY
Qty: 90 CAPSULE | Refills: 0 | Status: SHIPPED | OUTPATIENT
Start: 2025-07-26

## 2025-07-26 RX ORDER — LEVOTHYROXINE SODIUM 125 UG/1
125 TABLET ORAL
Qty: 30 TABLET | Refills: 0 | Status: SHIPPED | OUTPATIENT
Start: 2025-07-26 | End: 2025-08-25

## 2025-07-26 RX ORDER — NALTREXONE HYDROCHLORIDE 50 MG/1
50 TABLET, FILM COATED ORAL DAILY
Qty: 30 TABLET | Refills: 3 | Status: SHIPPED | OUTPATIENT
Start: 2025-07-26 | End: 2025-11-23

## 2025-07-26 RX ORDER — HYDROXYZINE HYDROCHLORIDE 25 MG/1
25 TABLET, FILM COATED ORAL EVERY 6 HOURS PRN
Qty: 20 TABLET | Refills: 0 | Status: SHIPPED | OUTPATIENT
Start: 2025-07-26

## 2025-07-26 RX ORDER — HEPARIN 100 UNIT/ML
5 SYRINGE INTRAVENOUS ONCE
Status: DISCONTINUED | OUTPATIENT
Start: 2025-07-26 | End: 2025-07-26 | Stop reason: HOSPADM

## 2025-07-26 RX ORDER — ACAMPROSATE CALCIUM 333 MG/1
666 TABLET, DELAYED RELEASE ORAL 3 TIMES DAILY
Qty: 180 TABLET | Refills: 3 | Status: SHIPPED | OUTPATIENT
Start: 2025-07-26 | End: 2025-11-23

## 2025-07-26 RX ADMIN — NALTREXONE HYDROCHLORIDE 50 MG: 50 TABLET, FILM COATED ORAL at 10:42

## 2025-07-26 RX ADMIN — THIAMINE HCL TAB 100 MG 100 MG: 100 TAB at 08:45

## 2025-07-26 RX ADMIN — ONDANSETRON 4 MG: 2 INJECTION INTRAMUSCULAR; INTRAVENOUS at 08:04

## 2025-07-26 RX ADMIN — Medication 1 TABLET: at 08:45

## 2025-07-26 RX ADMIN — LEVETIRACETAM 500 MG: 500 TABLET, FILM COATED ORAL at 08:45

## 2025-07-26 RX ADMIN — MAGNESIUM SULFATE HEPTAHYDRATE 2 G: 40 INJECTION, SOLUTION INTRAVENOUS at 08:45

## 2025-07-26 RX ADMIN — FOLIC ACID 1 MG: 1 TABLET ORAL at 08:45

## 2025-07-26 RX ADMIN — CEFTRIAXONE 2 G: 2 INJECTION, SOLUTION INTRAVENOUS at 02:17

## 2025-07-26 RX ADMIN — KETOROLAC TROMETHAMINE 15 MG: 30 INJECTION, SOLUTION INTRAMUSCULAR; INTRAVENOUS at 10:40

## 2025-07-26 RX ADMIN — ESCITALOPRAM OXALATE 10 MG: 10 TABLET ORAL at 08:45

## 2025-07-26 RX ADMIN — LEVOTHYROXINE SODIUM 125 MCG: 0.12 TABLET ORAL at 07:46

## 2025-07-26 RX ADMIN — AZITHROMYCIN 500 MG: 500 INJECTION, POWDER, LYOPHILIZED, FOR SOLUTION INTRAVENOUS at 03:25

## 2025-07-26 RX ADMIN — PHENOBARBITAL 64.8 MG: 32.4 TABLET ORAL at 08:45

## 2025-07-26 RX ADMIN — LIDOCAINE 1 PATCH: 4 PATCH TOPICAL at 08:45

## 2025-07-26 RX ADMIN — TAMSULOSIN HYDROCHLORIDE 0.4 MG: 0.4 CAPSULE ORAL at 08:45

## 2025-07-26 RX ADMIN — ACAMPROSATE CALCIUM 666 MG: 333 TABLET, DELAYED RELEASE ORAL at 09:03

## 2025-07-26 RX ADMIN — GABAPENTIN 300 MG: 300 CAPSULE ORAL at 08:45

## 2025-07-26 ASSESSMENT — LIFESTYLE VARIABLES
PAROXYSMAL SWEATS: NO SWEAT VISIBLE
ORIENTATION AND CLOUDING OF SENSORIUM: ORIENTED AND CAN DO SERIAL ADDITIONS
HEADACHE, FULLNESS IN HEAD: NOT PRESENT
ANXIETY: MILDLY ANXIOUS
NAUSEA AND VOMITING: MILD NAUSEA WITH NO VOMITING
HEADACHE, FULLNESS IN HEAD: VERY MILD
AUDITORY DISTURBANCES: NOT PRESENT
TREMOR: NOT VISIBLE, BUT CAN BE FELT FINGERTIP TO FINGERTIP
NAUSEA AND VOMITING: NO NAUSEA AND NO VOMITING
VISUAL DISTURBANCES: NOT PRESENT
AUDITORY DISTURBANCES: NOT PRESENT
TREMOR: NOT VISIBLE, BUT CAN BE FELT FINGERTIP TO FINGERTIP
AGITATION: NORMAL ACTIVITY
ORIENTATION AND CLOUDING OF SENSORIUM: ORIENTED AND CAN DO SERIAL ADDITIONS
PAROXYSMAL SWEATS: NO SWEAT VISIBLE
ANXIETY: MILDLY ANXIOUS
AGITATION: NORMAL ACTIVITY
VISUAL DISTURBANCES: NOT PRESENT
TOTAL SCORE: 2
TOTAL SCORE: 4

## 2025-07-26 ASSESSMENT — PAIN SCALES - GENERAL
PAINLEVEL_OUTOF10: 6
PAINLEVEL_OUTOF10: 6
PAINLEVEL_OUTOF10: 9

## 2025-07-26 ASSESSMENT — PAIN - FUNCTIONAL ASSESSMENT: PAIN_FUNCTIONAL_ASSESSMENT: 0-10

## 2025-07-26 ASSESSMENT — PAIN DESCRIPTION - LOCATION: LOCATION: SHOULDER

## 2025-07-26 ASSESSMENT — PAIN DESCRIPTION - ORIENTATION: ORIENTATION: LEFT

## 2025-07-26 NOTE — CARE PLAN
Problem: Safety - Adult  Goal: Free from fall injury  Outcome: Progressing     Problem: Pain  Goal: Takes deep breaths with improved pain control throughout the shift  Outcome: Progressing  Goal: Turns in bed with improved pain control throughout the shift  Outcome: Progressing  Goal: Walks with improved pain control throughout the shift  Outcome: Progressing  Goal: Performs ADL's with improved pain control throughout shift  Outcome: Progressing   The patient's goals for the shift include      The clinical goals for the shift include P)atient will nremain free from falls/injury the whole shift.    Over the shift, the patient did make progress toward the following goals.

## 2025-07-26 NOTE — PROGRESS NOTES
07/26/25 1322   Discharge Planning   Home or Post Acute Services None   Expected Discharge Disposition Home   Does the patient need discharge transport arranged? Yes   [facility] coordination needed? Yes   Has discharge transport been arranged? Yes   What day is the transport expected? 07/26/25       Patient will discharge to home. Patient has a portable oxygen tank at the bedside. Patient will discharge via Ly[employer].    Flor Garcia RN, BSN  Transitional Care Coordinator

## 2025-07-26 NOTE — DISCHARGE SUMMARY
Discharge Diagnosis  #Alcohol withdrawal syndrome without complication (Multi)  #EtOH withdrawal  #Electrolytes abnormalities due to substance use disorder and AUD   #Hypokalemia replaced   #Hypophosphatemia replaced   #Hypomagnesemia   #Sinus Tachycardia, likely due to Alcohol withdrawal  vs dehydration. Resolving     #malnutrition   #AUD  #hypothyroidism   #LLL PNA, likely aspiration   #Fall, mechanical            Issues Requiring Follow-Up  Fu with PCP   Fu with addictive medicine      Discharge Meds     Medication List      START taking these medications     acamprosate 333 mg DR tablet; Commonly known as: Campral; Take 2 tablets   (666 mg) by mouth 3 times a day. Do not crush, chew, or split.   amoxicillin-clavulanate 875-125 mg tablet; Commonly known as: Augmentin;   Take 1 tablet by mouth 2 times a day for 3 days.   folic acid 1 mg tablet; Commonly known as: Folvite; Take 1 tablet (1 mg)   by mouth once daily.   mirtazapine 15 mg tablet; Commonly known as: Remeron; Take 1 tablet (15   mg) by mouth once daily at bedtime.   multivitamin with minerals tablet; Take 1 tablet by mouth once daily.   naltrexone 50 mg tablet; Commonly known as: Depade; Take 1 tablet (50   mg) by mouth once daily.   polyethylene glycol 17 gram packet; Commonly known as: Glycolax,   Miralax; Take 17 g by mouth once daily as needed (constipation).   tamsulosin 0.4 mg 24 hr capsule; Commonly known as: Flomax; Take 1   capsule (0.4 mg) by mouth once daily. Do not crush, chew, or split.; Start   taking on: July 27, 2025     CHANGE how you take these medications     acetaminophen 500 mg tablet; Commonly known as: Tylenol; Take 2 tablets   (1,000 mg) by mouth every 6 hours if needed for mild pain (1 - 3),   headaches or fever (temp greater than 38.0 C).; What changed: reasons to   take this   gabapentin 300 mg capsule; Commonly known as: Neurontin; Take 1 capsule   (300 mg) by mouth 3 times a day.; What changed: when to take this     CONTINUE  taking these medications     cetirizine 10 mg tablet; Commonly known as: ZyrTEC   escitalopram 10 mg tablet; Commonly known as: Lexapro   hydrOXYzine HCL 25 mg tablet; Commonly known as: Atarax; Take 1 tablet   (25 mg) by mouth every 6 hours if needed for itching or anxiety.   levETIRAcetam 1,000 mg tablet; Commonly known as: Keppra; Take 1 tablet   (1,000 mg) by mouth once daily.   levothyroxine 125 mcg tablet; Commonly known as: Synthroid, Levoxyl;   Take 1 tablet (125 mcg) by mouth once daily.   magnesium oxide 400 mg tablet; Commonly known as: Mag-Ox; Take 1 tablet   (400 mg) by mouth 2 times a day.   methocarbamol 500 mg tablet; Commonly known as: Robaxin; Take 1 tablet   (500 mg) by mouth 3 times a day as needed for muscle spasms.   QUEtiapine 25 mg tablet; Commonly known as: SEROquel; Take 1 tablet (25   mg) by mouth once daily at bedtime.   thiamine 100 mg tablet; Commonly known as: Vitamin B-1; Take 1 tablet   (100 mg) by mouth once daily.     STOP taking these medications     traMADol 50 mg tablet; Commonly known as: Ultram       Test Results Pending At Discharge  Pending Labs       Order Current Status    Barbiturate, Urine, Confirmation In process    Benzodiazepine Confirmation, Urine In process            Mr. Storm Barrios is a 40-year-old male with a complex medical history that includes alcohol use disorder, tobacco use, hypothyroidism, Hodgkin’s lymphoma status post radiation, chronic respiratory insufficiency requiring 2-3 L nasal cannula oxygen at baseline, prior upper extremity DVT treated with Eliquis in 2022, grade D esophagitis, major depressive disorder, and malnutrition. He presented to the hospital following a mechanical fall and was found to be experiencing alcohol withdrawal and signs of aspiration pneumonia, with imaging confirming left lower lobe involvement. On admission, he exhibited classic features of moderate alcohol withdrawal, including tremors and tachycardia. A  phenobarbital taper was initiated with good clinical response. Medical Toxicology was consulted and endorsed continuation of his home dose of gabapentin 300 mg TID. Given his recent use of opioid analgesics for acute pain, initiation of naltrexone was deferred until symptoms improved. Once stable, he was started on naltrexone 50 mg daily and acamprosate, both of which are to be continued indefinitely. Mr. Barrios expressed interest in pursuing treatment for alcohol use disorder, and a referral was placed to the [facility] program via Social Work to support his recovery.  His pneumonia was managed empirically with antibiotics, with significant improvement in his respiratory status. He no longer required supplemental oxygen and was discharged on a short course of Augmentin to complete therapy. He remained hemodynamically stable throughout hospitalization.  Laboratory evaluation on admission revealed multiple electrolyte derangements, including hypokalemia, hypophosphatemia, and hypomagnesemia, which were all appropriately replaced. Due to clinical features of undernutrition, the nutrition team was consulted. He was diagnosed with malnutrition and started on Ensure Plus TID, with reinforcement of the need for ongoing nutritional support post-discharge.  He also has a known history of hypothyroidism, though he admitted to poor adherence with his prescribed levothyroxine 125 mcg daily. Endocrinology was consulted in light of a 1.3 cm left thyroid nodule noted on prior imaging and persistently elevated TSH levels. They recommended resuming levothyroxine at the same dose and arranged outpatient follow-up with repeat TFTs.  During this admission, the patient also reported lower urinary tract symptoms. Flomax 0.4 mg daily was initiated, and he successfully completed a trial of voiding after Vargas catheter removal without evidence of retention.  At the time of discharge, Mr. Barrios was hemodynamically stable, alert, and  oriented, with resolution of withdrawal symptoms and improvement in respiratory and nutritional status. He was discharged on a structured medication regimen that includes naltrexone, acamprosate, gabapentin, levothyroxine, thiamine, folate, multivitamins, Ensure Plus TID, Augmentin to complete pneumonia treatment, and Flomax. Appropriate follow-up was arranged with his PCP, Endocrinology, and the [facility] substance use program. He was fully counseled on his care plan and expressed understanding and readiness for discharge.       Time 25 min     Hospital Course     Pertinent Physical Exam At Time of Discharge  Physical Exam    Outpatient Follow-Up  No future appointments.      Purvi Rai MD

## 2025-07-26 NOTE — CONSULTS
Inpatient consult to Endocrinology  Consult performed by: Mily Mullins MD  Consult ordered by: Purvi Rai MD  Reason for consult: elevated TSH in setting of alcohol withdrawl, should we start Levoth now inpatient?          Reason For Consult  elevated TSH in setting of alcohol withdrawl, should we start Levoth now inpatient?     History Of Present Illness  Storm Barrios is a 40 y.o. male with a past medical history significant of but not limited to polysubstance use disorder ( EToH, tobacco) c/b pancreatitis, alcoholic hepatitis, hodgkin's lymphoma s/p radiation c/b resp failure on 2-3 L oxygen via NC, DVT upper extremity treated with eliquis in 2022, grade D esophagitis, hypothyroidism, MDD presented to the hospital after a mechanical fall. He was admitted to the hospital for management of ETOH withdrawal and is being treated with phenobarbital taper and CIWA. He was also found to have pneumonia of the LLL which was thought to be aspirational, and is being treated for it with Abx. We have been asked to see the patient for management of his hypothyroidism.    Per chart review, the patient had a CT C/A/P in 2021 which revealed a 6 mm hypointensity in his L thyroid lobe and a CT agio chest confirmed a 1.3 cm L thyroid lobe nodule. He has had a history of high TSH since 2022 likely due to non-compliance with LT4.    The patient mentions that he was diagnosed with hypothyroidism years ago, and has been on LT4 125 mcg daily. He is not compliant with the medicine and mentions he forgets to take it very often. He reports that the transfusion center at [facility] was managing his hypothyroidism. He denied any active concerns except for aches and pains from his fall. He was started on his home LT4 here upon admission.    Past Medical History  He has no past medical history on file.    Surgical History  He has no past surgical history on file.     Social History  He reports that he has been smoking cigarettes. He has  "been exposed to tobacco smoke. He has never used smokeless tobacco. He reports current alcohol use of about 12.0 - 28.0 standard drinks of alcohol per week. No history on file for drug use.    Family History  Family History[1]     Allergies  Patient has no known allergies.    Review of Systems  Negative unless noted above     Physical Exam    General: male patient, anxious  HEENT: AT/NC  Neck: trachea in midline, firm thyroid with a small nodule palpated on the L side  Resp: CTA B/L  CVS: normal s1 and s2  Abdomen: soft and non tender to palpation, BS+  Skin: warm, dry and intact  Neuro: AAO x3, DTRs delayed relaxation, + Tremor noted B/L on outstretched hand  Psych: cooperative       ROS, PMH, FH/SH, surgical history and allergies have been reviewed.    Last Recorded Vitals  Blood pressure 126/88, pulse 99, temperature 36.5 °C (97.7 °F), resp. rate 16, height 1.702 m (5' 7\"), weight 57.6 kg (126 lb 15.8 oz), SpO2 96%.    Relevant Results  Results from last 7 days   Lab Units 07/26/25  0544 07/25/25  0609 07/23/25  2122   GLUCOSE mg/dL 87 122* 194*      Latest Reference Range & Units 07/25/25 12:24   Thyroid Stimulating Hormone 0.44 - 3.98 mIU/L 29.28 (H)   (H): Data is abnormally high     Assessment/Plan   Assessment & Plan  Alcohol withdrawal syndrome without complication (Multi)    The patient is a 40 year old male who is admitted to the hospital for management of alcohol withdrawal. We have been asked to see him for his hypothyroidism.    - Levothyroxine 125 mcg daily. Please take the medicine empty stomach every morning with plain water. Wait for 30 minutes before consuming any food or beverages. If you take Iron, Calcium or PPI, wait for atleast 4 hours after LT4  - For Tube feeds, please hold them 1 hour prior and 2 hours after the levothyroxine.  - Repeat TFTs (TSH, FT4) as an OP with his PCP.    Thank you for the consult. The patient was seen and discussed with Dr. Helena Hannon. We will sign off.    Mily " MD Harpreet  PGY-5 Endocrinology Fellow         [1] No family history on file.

## 2025-07-26 NOTE — PROGRESS NOTES
"Storm Barrios is a 40 y.o. male on day 2 of admission presenting with Alcohol withdrawal syndrome without complication (Multi).    Subjective   No overnight events reported. No new concerns.       Objective     Physical Exam  Constitutional:       General: He is not in acute distress.  HENT:      Head: Normocephalic and atraumatic.      Right Ear: External ear normal.      Left Ear: External ear normal.      Nose: Nose normal.      Mouth/Throat:      Mouth: Mucous membranes are moist.      Pharynx: Oropharynx is clear.      Eyes:      Extraocular Movements: Extraocular movements intact.      Conjunctiva/sclera: Conjunctivae normal.         Cardiovascular:      Rate and Rhythm: Normal rate and regular rhythm.   Pulmonary:      Effort: Pulmonary effort is normal. No respiratory distress.   Abdominal:      General: There is no distension.      Palpations: Abdomen is soft.      Musculoskeletal:         General: No swelling or deformity.      Cervical back: Normal range of motion and neck supple.      Skin:     General: Skin is warm and dry.      Neurological:      General: No focal deficit present.      Mental Status: He is alert and oriented to person, place, and time.      Psychiatric:      Comments: Calm and cooperative.     Last Recorded Vitals  Blood pressure 126/88, pulse 99, temperature 36.5 °C (97.7 °F), resp. rate 16, height 1.702 m (5' 7\"), weight 57.6 kg (126 lb 15.8 oz), SpO2 96%.  Intake/Output last 3 Shifts:  I/O last 3 completed shifts:  In: 3383.3 (58.7 mL/kg) [P.O.:1120; IV Piggyback:2263.3]  Out: 1050 (18.2 mL/kg) [Urine:1050 (0.5 mL/kg/hr)]  Weight: 57.6 kg     Relevant Results                            Assessment & Plan  Alcohol withdrawal syndrome without complication (Multi)    40M currently admitted for alcohol withdrawal and pneumonia. Patient meets criteria for alcohol use disorder.     He is now >72 hours from his last drink, with no objective findings of withdrawal today. I do not " expect him to have any further clinically significant withdrawal at this time, and from a withdrawal perspective he would be ok to discharge today. Can keep scheduled phenobarb order while patient remaining admitted, but does not need to be discharged with additional phenobarbital at this time as he has been appropriately loaded and this medication has a significantly long half-life (>60 hrs) and will effectively self-taper.      He has been started on acamprosate, and I have added naltrexone today. He meets criteria for severe alcohol use disorder and these medications should be continued indefinitely even if patient is not interested in stopping drinking in the future (eg future hospitalizations), although he currently is motivated to stop drinking. I have sent prescriptions for these meds, please ensure he has them prior to discharge.     Continue thiamine, folate, multivitamin, indefinitely, and close electrolyte and nutritional support while in hospital.     Patient is interested in treatment options, and I have placed an order for social work to consult [facility]. Please ensure that patient speaks to [facility], and they will be able to help facilitate his disposition. If he wants to go to a facility/program then he should be discharged to one.     Patient and primary team updated. Medical Toxicology will sign off at this time. Please reach out via secure chat with additional questions.      I spent 42 minutes in the professional and overall care of this patient.      Kiet Betancourt MD

## 2025-07-27 LAB
BUTALBITAL UR-MCNC: <50 NG/ML
PENTOBARB UR-MCNC: <50 NG/ML
PHENOBARB UR-MCNC: >5000 NG/ML

## 2025-07-30 LAB
1OH-MIDAZOLAM UR CFM-MCNC: <25 NG/ML
7AMINOCLONAZEPAM UR CFM-MCNC: <25 NG/ML
A-OH ALPRAZ UR CFM-MCNC: <25 NG/ML
ALPRAZ UR CFM-MCNC: <25 NG/ML
CHLORDIAZEP UR CFM-MCNC: <25 NG/ML
CLONAZEPAM UR CFM-MCNC: <25 NG/ML
DIAZEPAM UR CFM-MCNC: <25 NG/ML
LORAZEPAM UR CFM-MCNC: 129 NG/ML
MIDAZOLAM UR CFM-MCNC: <25 NG/ML
NORDIAZEPAM UR CFM-MCNC: <25 NG/ML
OXAZEPAM UR CFM-MCNC: 78 NG/ML
TEMAZEPAM UR CFM-MCNC: <25 NG/ML

## 2025-08-01 ENCOUNTER — APPOINTMENT (OUTPATIENT)
Dept: FAMILY MEDICINE | Age: 40
End: 2025-08-01

## 2025-08-13 ENCOUNTER — APPOINTMENT (OUTPATIENT)
Dept: BEHAVIORAL HEALTH | Age: 40
End: 2025-08-13

## 2025-08-13 DIAGNOSIS — Z79.899 HIGH RISK MEDICATION USE: Primary | ICD-10-CM

## 2025-08-13 PROCEDURE — 99215 OFFICE O/P EST HI 40 MIN: CPT | Performed by: REGISTERED NURSE

## 2025-08-13 RX ORDER — CLONIDINE HYDROCHLORIDE 0.3 MG/1
0.3 TABLET ORAL AT BEDTIME
Qty: 90 TABLET | Refills: 1 | Status: SHIPPED | OUTPATIENT
Start: 2025-08-13

## 2025-08-15 ENCOUNTER — LAB SERVICES (OUTPATIENT)
Dept: LAB | Age: 40
End: 2025-08-15

## 2025-08-15 DIAGNOSIS — Z79.899 HIGH RISK MEDICATION USE: ICD-10-CM

## 2025-08-15 PROCEDURE — G0483 DRUG TEST DEF 22+ CLASSES: HCPCS | Performed by: CLINICAL MEDICAL LABORATORY

## 2025-08-15 PROCEDURE — 80307 DRUG TEST PRSMV CHEM ANLYZR: CPT | Performed by: CLINICAL MEDICAL LABORATORY

## 2025-08-22 LAB
6MAM UR QL: NEGATIVE NG/ML
7AMINOCLONAZEPAM UR QL: NEGATIVE NG/ML
A-OH ALPRAZ UR QL: NEGATIVE NG/ML
ALPRAZ UR QL: NEGATIVE NG/ML
AMITRIP UR QL: NEGATIVE NG/ML
AMPHET UR-MCNC: 82 NG/ML
AMPHETAMINES UR QL: DETECTED NG/ML
BARBITURATES UR QL SCN>200 NG/ML: NEGATIVE
BUPRENORPHINE UR QL: NEGATIVE NG/ML
BUPROPION UR QL: NEGATIVE NG/ML
BZE UR QL: NEGATIVE NG/ML
CARBOXYTHC UR QL: NEGATIVE NG/ML
CARISOPRODOL UR QL: NEGATIVE NG/ML
CITALOPRAM UR QL: NEGATIVE NG/ML
CODEINE UR QL: NEGATIVE NG/ML
CREAT UR-MCNC: 211 MG/DL
CYCLOBENZAPRINE UR QL: NEGATIVE NG/ML
DULOXETINE UR QL: NEGATIVE NG/ML
EDDP UR QL: NEGATIVE NG/ML
ETHANOL UR-MCNC: NEGATIVE MG/DL
FENTANYL UR QL: NEGATIVE NG/ML
FLUOXETINE UR QL: NEGATIVE NG/ML
GABAPENTIN UR QL: NEGATIVE NG/ML
HYDROCODONE UR QL: NEGATIVE NG/ML
HYDROMORPHONE UR QL: NEGATIVE NG/ML
KETAMINE UR QL: NEGATIVE NG/ML
LORAZEPAM UR QL: NEGATIVE NG/ML
MDA UR QL: NEGATIVE NG/ML
MDMA UR QL: NEGATIVE NG/ML
ME-PHENIDATE UR QL: NEGATIVE NG/ML
MEPERIDINE UR QL: NEGATIVE NG/ML
MEPROBAMATE UR QL: NEGATIVE NG/ML
METHADONE UR QL: NEGATIVE NG/ML
METHAMPHET UR QL: NEGATIVE NG/ML
MIRTAZAPINE UR QL: NEGATIVE NG/ML
MORPHINE UR QL: NEGATIVE NG/ML
NALOXONE UR QL CFM: NEGATIVE NG/ML
NALTREXONE UR QL: NEGATIVE NG/ML
NORBUPRENORPHINE UR QL CFM: NEGATIVE NG/ML
NORDIAZEPAM UR QL: NEGATIVE NG/ML
NORDOXEPIN UR QL: NEGATIVE NG/ML
NORFENTANYL UR QL: NEGATIVE NG/ML
NORHYDROCODONE UR QL CFM: NEGATIVE NG/ML
NORMEPERIDINE UR QL: NEGATIVE NG/ML
NOROXYCODONE UR QL CFM: NEGATIVE NG/ML
NORTRIP UR QL: NEGATIVE NG/ML
O-NORTRAMADOL UR QL: NEGATIVE NG/ML
OXAZEPAM UR QL: NEGATIVE NG/ML
OXIDANTS UR QL: ABNORMAL
OXYCODONE UR QL: NEGATIVE NG/ML
OXYMORPHONE UR QL: NEGATIVE NG/ML
PAROXETINE UR QL: NEGATIVE NG/ML
PCP UR QL: NEGATIVE NG/ML
PH UR: 5.5 [PH] (ref 4.5–9)
PHENTERMINE UR QL: NEGATIVE NG/ML
PREGABALIN UR QL CFM: NEGATIVE NG/ML
PROLINTANE UR-MCNC: NEGATIVE NG/ML
PROPOXYPH UR QL: NEGATIVE NG/ML
SERTRALINE UR QL: NEGATIVE NG/ML
TAPENTADOL UR QL CFM: NEGATIVE NG/ML
TEMAZEPAM UR QL: NEGATIVE NG/ML
TRAMADOL UR QL: NEGATIVE NG/ML
VENLAFAXINE UR QL: NEGATIVE NG/ML
ZOLPIDEM UR QL: NEGATIVE NG/ML

## 2025-09-05 ENCOUNTER — APPOINTMENT (OUTPATIENT)
Dept: FAMILY MEDICINE | Age: 40
End: 2025-09-05

## 2025-11-17 ENCOUNTER — APPOINTMENT (OUTPATIENT)
Dept: FAMILY MEDICINE | Age: 40
End: 2025-11-17

## 2025-11-17 ENCOUNTER — APPOINTMENT (OUTPATIENT)
Dept: BEHAVIORAL HEALTH | Age: 40
End: 2025-11-17

## (undated) DEVICE — ENDOGATOR AUXILIARY WATER JET CONNECTOR: Brand: ENDOGATOR

## (undated) DEVICE — YANKAUER,BULB TIP WITH VENT: Brand: ARGYLE

## (undated) DEVICE — FRCP BX RADJAW4 NDL 2.8 240 STD OG

## (undated) DEVICE — CONMED SCOPE SAVER BITE BLOCK, 20X27 MM: Brand: SCOPE SAVER

## (undated) DEVICE — Device: Brand: DEFENDO AIR/WATER/SUCTION AND BIOPSY VALVE

## (undated) DEVICE — CUFF,BP,DISP,1 TUBE,ADULT,HP: Brand: MEDLINE

## (undated) DEVICE — SENSR O2 OXIMAX FNGR A/ 18IN NONSTR

## (undated) DEVICE — THE CHANNEL CLEANING BRUSH IS A NYLON FLEXI BRUSH ATTACHED TO A FLEXIBLE PLASTIC SHEATH DESIGNED TO SAFELY REMOVE DEBRIS FROM FLEXIBLE ENDOSCOPES.

## (undated) DEVICE — TBG SMPL FLTR LINE NASL 02/C02 A/ BX/100